# Patient Record
Sex: FEMALE | Race: WHITE | Employment: OTHER | URBAN - METROPOLITAN AREA
[De-identification: names, ages, dates, MRNs, and addresses within clinical notes are randomized per-mention and may not be internally consistent; named-entity substitution may affect disease eponyms.]

---

## 2019-01-13 ENCOUNTER — OFFICE VISIT (OUTPATIENT)
Dept: FAMILY MEDICINE CLINIC | Facility: CLINIC | Age: 29
End: 2019-01-13
Payer: COMMERCIAL

## 2019-01-13 VITALS
HEIGHT: 67 IN | SYSTOLIC BLOOD PRESSURE: 113 MMHG | DIASTOLIC BLOOD PRESSURE: 67 MMHG | HEART RATE: 84 BPM | TEMPERATURE: 98 F | WEIGHT: 134 LBS | RESPIRATION RATE: 12 BRPM | OXYGEN SATURATION: 100 % | BODY MASS INDEX: 21.03 KG/M2

## 2019-01-13 DIAGNOSIS — N30.00 ACUTE CYSTITIS WITHOUT HEMATURIA: Primary | ICD-10-CM

## 2019-01-13 LAB
MULTISTIX LOT#: ABNORMAL NUMERIC
PH, URINE: 5 (ref 4.5–8)
SPECIFIC GRAVITY: 1.02 (ref 1–1.03)
URINE-COLOR: YELLOW
UROBILINOGEN,SEMI-QN: 0.2 MG/DL (ref 0–1.9)

## 2019-01-13 PROCEDURE — 87186 SC STD MICRODIL/AGAR DIL: CPT | Performed by: NURSE PRACTITIONER

## 2019-01-13 PROCEDURE — 87088 URINE BACTERIA CULTURE: CPT | Performed by: NURSE PRACTITIONER

## 2019-01-13 PROCEDURE — 87086 URINE CULTURE/COLONY COUNT: CPT | Performed by: NURSE PRACTITIONER

## 2019-01-13 PROCEDURE — 81003 URINALYSIS AUTO W/O SCOPE: CPT | Performed by: NURSE PRACTITIONER

## 2019-01-13 PROCEDURE — 99202 OFFICE O/P NEW SF 15 MIN: CPT | Performed by: NURSE PRACTITIONER

## 2019-01-13 RX ORDER — NITROFURANTOIN 25; 75 MG/1; MG/1
100 CAPSULE ORAL 2 TIMES DAILY
Qty: 10 CAPSULE | Refills: 0 | Status: SHIPPED | OUTPATIENT
Start: 2019-01-13 | End: 2019-01-18

## 2019-01-13 RX ORDER — IBUPROFEN 200 MG
400 TABLET ORAL EVERY 6 HOURS PRN
COMMUNITY
End: 2021-09-07

## 2019-01-13 NOTE — PROGRESS NOTES
CHIEF COMPLAINT:   Patient presents with:  Urinary        HPI:   Stanley Farooq is a 33year old female presents with symptoms of UTI. Complaining of urinary frequency, urgency for last 2 days and dysuria today. Symptoms have been mild since onset.   Denies Recent Results (from the past 24 hour(s))   URINALYSIS, AUTO, W/O SCOPE    Collection Time: 01/13/19 11:03 AM   Result Value Ref Range    GLUCOSE (URINE DIPSTICK) neg mg/dL    BILIRUBIN neg Negative    KETONES (URINE DIPSTICK) neg Negative mg/dL    SPECIFI Whenever on antibiotics: Recommend PROBIOTICS supplement  OTC (such as Florastor or Culturelle), to restore normal bacteria balance, prevent yeast infection and GI effects/diarrhea from antibiotic therapy.   Take  DURING COURSE OF ANTIBIOTICS AND for the FO The most common place for an infection is in the bladder. This is called a bladder infection. This is one of the most common infections in women. Most bladder infections are easily treated. They are not serious unless the infection spreads to the kidney. Bladder infections are diagnosed by a urine test. They are treated with antibiotics and usually clear up quickly without complications. Treatment helps prevent a more serious kidney infection.   Medicines  Medicines can help in the treatment of a bladder in Call your healthcare provider if all symptoms are not gone after 3 days of treatment. This is especially important if you have repeat infections. If a culture was done, you will be told if your treatment needs to be changed.  If directed, you can call to f

## 2019-01-13 NOTE — PATIENT INSTRUCTIONS
-Discussed with patient that most UTI’s are resolved after 3 days on antibiotic, but should continue for 5-7 days if symptoms persist.     Whenever on antibiotics: Recommend PROBIOTICS supplement  OTC (such as Florastor or Culturelle), to restore normal Urine is normally doesn't have any bacteria in it. But bacteria can get into the urinary tract from the skin around the rectum. Or they can travel in the blood from elsewhere in the body.  Once they are in your urinary tract, they can cause infection in the · Procedures such as having a catheter inserted  · Older age  · Not emptying your bladder. This can allow bacteria a chance to grow in your urine.   · Dehydration  · Constipation  · Sex  · Use of a diaphragm for birth control   Treatment  Bladder infections · Urinate right after intercourse to flush out your bladder. · If you use birth control pills and have frequent bladder infections, discuss it with your doctor.   Follow-up care  Call your healthcare provider if all symptoms are not gone after 3 days of tr

## 2019-01-22 ENCOUNTER — OFFICE VISIT (OUTPATIENT)
Dept: FAMILY MEDICINE CLINIC | Facility: CLINIC | Age: 29
End: 2019-01-22
Payer: COMMERCIAL

## 2019-01-22 VITALS
BODY MASS INDEX: 19.7 KG/M2 | OXYGEN SATURATION: 100 % | SYSTOLIC BLOOD PRESSURE: 106 MMHG | HEART RATE: 90 BPM | WEIGHT: 130 LBS | HEIGHT: 68 IN | DIASTOLIC BLOOD PRESSURE: 76 MMHG | RESPIRATION RATE: 18 BRPM | TEMPERATURE: 98 F

## 2019-01-22 DIAGNOSIS — R34 DECREASED URINE OUTPUT: ICD-10-CM

## 2019-01-22 DIAGNOSIS — J06.9 VIRAL URI WITH COUGH: Primary | ICD-10-CM

## 2019-01-22 LAB
APPEARANCE: CLEAR
MULTISTIX LOT#: ABNORMAL NUMERIC
PH, URINE: 5.5 (ref 4.5–8)
POCT INFLUENZA A: NEGATIVE
POCT INFLUENZA B: NEGATIVE
SPECIFIC GRAVITY: 1 (ref 1–1.03)
UROBILINOGEN,SEMI-QN: 0.2 MG/DL (ref 0–1.9)

## 2019-01-22 PROCEDURE — 81003 URINALYSIS AUTO W/O SCOPE: CPT | Performed by: NURSE PRACTITIONER

## 2019-01-22 PROCEDURE — 87502 INFLUENZA DNA AMP PROBE: CPT | Performed by: NURSE PRACTITIONER

## 2019-01-22 PROCEDURE — 99213 OFFICE O/P EST LOW 20 MIN: CPT | Performed by: NURSE PRACTITIONER

## 2019-01-22 RX ORDER — BROMPHENIRAMINE MALEATE, PSEUDOEPHEDRINE HYDROCHLORIDE, AND DEXTROMETHORPHAN HYDROBROMIDE 2; 30; 10 MG/5ML; MG/5ML; MG/5ML
10 SYRUP ORAL 4 TIMES DAILY PRN
Qty: 120 ML | Refills: 0 | Status: SHIPPED | OUTPATIENT
Start: 2019-01-22 | End: 2021-09-07

## 2019-01-22 NOTE — PATIENT INSTRUCTIONS
·     · Hydrate! (cold or hot based on comfort). Drink lots of water or other non dehydrating liquids to help with illness. Salty foods, soups and tea can help with throat pain. The more fluid you drink, the more urine output you will have.     · DECREASE · If symptoms are severe, rest at home for the first 2 to 3 days. When you resume activity, don't let yourself get too tired. · Avoid being exposed to cigarette smoke (yours or others’).   · You may use acetaminophen or ibuprofen to control pain and fever, © 6579-5343 [employer] [address]. [address] [facility], [address] [place]. All rights reserved. This information is not intended as a substitute for professional medical care. Always follow your healthcare professional's instructions.           Adult · As your appetite returns, you can resume your normal diet. Ask your healthcare provider if there are any foods you should avoid.   When to seek medical care  When you first notice symptoms, ask your healthcare provider if antiviral medicines are appropria · Drink at least 12 8-ounce glasses of fluid every day to resolve the dehydration.  Fluid may include water; orange juice; lemonade; apple, grape, or cranberry juice; clear fruit drinks; electrolyte replacement and sports drinks; and teas and coffee without

## 2019-01-22 NOTE — PROGRESS NOTES
CHIEF COMPLAINT:   Patient presents with:  Cough/URI  UTI: uti symptoms better but still has little urine. seen on 1/13 and finished antibiotics. HPI:   Milad Moon is a 33year old female who presents for sudden onset of flu-like symptoms.  Sympto CARDIOVASCULAR: denies chest pain or palpitations   GI: denies abdominal pain      EXAM:   /76 (BP Location: Left arm, Patient Position: Sitting, Cuff Size: adult)   Pulse 90   Temp 98.2 °F (36.8 °C) (Oral)   Resp 18   Ht 68\"   Wt 130 lb   LMP 01/20 POCT INFLUENZA A Negative Negative    POCT INFLUENZA B Negative Negative    POCT Lot Number [id number]     POCT Expiration Date 10 11 19     POCT Procedure Control Control Valid Control Valid       ASSESSMENT AND PLAN:   Kenya Oconnor is a 33year old female · Saline nasal spray to nostrils if needed to help remove drainage or congestion in nose. · OTC Nasacort or Flonase Allergy 24HR (steroid nasal spray)  daily if needed for severe nasal congestion and post nasal drip, if not contraindicated.   · May use Tyl · Your appetite may be poor, so a light diet is fine. Avoid dehydration by drinking 6 to 8 glasses of fluids per day (water, soft drinks, juices, tea, or soup). Extra fluids will help loosen secretions in the nose and lungs.   · Over-the-counter cold medici · Take acetaminophen or a nonsteroidal anti-inflammatory agent (NSAID), such as ibuprofen. Treat a troubled nose kindly  · Breathe steam or heated humidified air to open blocked nasal passages.  a hot shower or use a vaporizer.  Be careful not to g Date Last Reviewed: 12/1/2016  © 7318-7442 [employer] [address]. [address] [facility], [address] [place]. All rights reserved. This information is not intended as a substitute for professional medical care.  Always follow your healthcare professional Call your healthcare provider right away if any of these occur:  · Continued vomiting  · Frequent diarrhea (more than 5 times a day); blood (red or black color) or mucus in diarrhea  · Blood in vomit or stool  · Swollen abdomen or increasing abdominal pain

## 2019-01-26 ENCOUNTER — OFFICE VISIT (OUTPATIENT)
Dept: FAMILY MEDICINE CLINIC | Facility: CLINIC | Age: 29
End: 2019-01-26
Payer: COMMERCIAL

## 2019-01-26 VITALS
OXYGEN SATURATION: 98 % | DIASTOLIC BLOOD PRESSURE: 69 MMHG | SYSTOLIC BLOOD PRESSURE: 106 MMHG | TEMPERATURE: 98 F | HEART RATE: 98 BPM | RESPIRATION RATE: 16 BRPM

## 2019-01-26 DIAGNOSIS — J06.9 VIRAL URI WITH COUGH: Primary | ICD-10-CM

## 2019-01-26 PROCEDURE — 99213 OFFICE O/P EST LOW 20 MIN: CPT | Performed by: NURSE PRACTITIONER

## 2019-01-26 RX ORDER — BENZONATATE 200 MG/1
CAPSULE ORAL
Qty: 20 CAPSULE | Refills: 0 | Status: SHIPPED | OUTPATIENT
Start: 2019-01-26 | End: 2021-09-07

## 2019-01-26 NOTE — PROGRESS NOTES
CHIEF COMPLAINT:   Patient presents with:  URI: URI Symptoms x1 week. Already seen here for Sxs 4 days ago. HPI:   Alexandria Jang is a 33year old female who presents for continued upper respiratory symptoms for  1 week now.  Pt was already seen here f SKIN: no rashes or abnormal skin lesions  HEENT: See HPI  LUNGS: denies shortness of breath or wheezing, See HPI  CARDIOVASCULAR: denies chest pain or palpitations   GI: denies N/V/C or abdominal pain  NEURO: Denies headaches    EXAM:   /69   Pulse 9 - Advised F/U visit if no improvement/worsening within 3-5 days; sooner if new fever >100.4F or worsening breathing. To ER if ever dyspnea, chest pain, SOB, or signs of dehydration.  - Pt verbalizes understanding and is agreeable w/ plan.     Meds & Refill · Your appetite may be poor, so a light diet is fine. Avoid dehydration by drinking 6 to 8 glasses of fluids per day (water, soft drinks, juices, tea, or soup). Extra fluids will help loosen secretions in the nose and lungs.   · Over-the-counter cold medici

## 2020-08-19 DIAGNOSIS — R50.9 FEVER AND CHILLS: Primary | ICD-10-CM

## 2020-08-19 DIAGNOSIS — R52 GENERALIZED BODY ACHES: ICD-10-CM

## 2020-08-20 ENCOUNTER — LAB ENCOUNTER (OUTPATIENT)
Dept: LAB | Facility: HOSPITAL | Age: 30
End: 2020-08-20
Attending: INTERNAL MEDICINE
Payer: COMMERCIAL

## 2020-08-20 DIAGNOSIS — R50.9 FEVER AND CHILLS: ICD-10-CM

## 2020-08-20 DIAGNOSIS — R52 GENERALIZED BODY ACHES: ICD-10-CM

## 2020-08-21 LAB — SARS-COV-2 RNA RESP QL NAA+PROBE: NOT DETECTED

## 2021-09-07 ENCOUNTER — OFFICE VISIT (OUTPATIENT)
Dept: FAMILY MEDICINE CLINIC | Facility: CLINIC | Age: 31
End: 2021-09-07
Payer: COMMERCIAL

## 2021-09-07 VITALS
BODY MASS INDEX: 21.19 KG/M2 | OXYGEN SATURATION: 97 % | DIASTOLIC BLOOD PRESSURE: 74 MMHG | SYSTOLIC BLOOD PRESSURE: 118 MMHG | WEIGHT: 135 LBS | HEART RATE: 74 BPM | TEMPERATURE: 98 F | HEIGHT: 67 IN | RESPIRATION RATE: 14 BRPM

## 2021-09-07 DIAGNOSIS — Z20.822 SUSPECTED COVID-19 VIRUS INFECTION: Primary | ICD-10-CM

## 2021-09-07 DIAGNOSIS — R50.9 FEVER AND CHILLS: ICD-10-CM

## 2021-09-07 LAB
CONTROL LINE PRESENT WITH A CLEAR BACKGROUND (YES/NO): YES YES/NO
KIT LOT #: NORMAL NUMERIC
STREP GRP A CUL-SCR: NEGATIVE

## 2021-09-07 PROCEDURE — 87880 STREP A ASSAY W/OPTIC: CPT | Performed by: PHYSICIAN ASSISTANT

## 2021-09-07 PROCEDURE — 3078F DIAST BP <80 MM HG: CPT | Performed by: PHYSICIAN ASSISTANT

## 2021-09-07 PROCEDURE — 3074F SYST BP LT 130 MM HG: CPT | Performed by: PHYSICIAN ASSISTANT

## 2021-09-07 PROCEDURE — 99213 OFFICE O/P EST LOW 20 MIN: CPT | Performed by: PHYSICIAN ASSISTANT

## 2021-09-07 PROCEDURE — 3008F BODY MASS INDEX DOCD: CPT | Performed by: PHYSICIAN ASSISTANT

## 2021-09-07 NOTE — PROGRESS NOTES
CHIEF COMPLAINT:   Patient presents with:  Covid-19 Test: out over weekend, fever on Sunday, nyquil/dayquil      HPI:   Valarie Miranda is a 35year old female who presents with symptoms as described below for 2 days.    Sx onset 9/5/21    • Fever:     Yes [x Breastfeeding No   BMI 21.14 kg/m²   GENERAL: appears well, well nourished, in no apparent distress  SKIN: no rashes,no suspicious lesions  HEAD: atraumatic, normocephalic.     EYES: conjunctiva clear  EARS: TM's non injected, no bulging, no retraction,no answered.

## 2021-09-07 NOTE — PATIENT INSTRUCTIONS
Coronavirus Disease 2019 (COVID-19)     [facility] is committed to the safety and well-being of our patients, members, employees, and communities.  As concerns arise about the new strain of coronavirus that causes COVID-19, [facility] exposure  • After day 7 from date of last exposure with a negative test result (test must occur on day 5 or later)  After stopping quarantine, you should  • Watch for symptoms until 14 days after exposure.   • If you have symptoms, immediately self-isolate Care     If you are awaiting test results or are confirmed positive for COVID -19, and your symptoms worsen at home with symptoms such as: extreme weakness, difficult breathing, or unrelenting fevers greater than 100.4 degrees Fahrenheit, you should contac Follow-up  If you are diagnosed with COVID, refrain from exercise until approved by your primary care provider. Please call your primary care provider within 2 days of your discharge to arrange for a telehealth follow-up.  CDC does not recommend repeat test Control & Prevention (CDC)  10 things you can do to manage your health at home, [email or web address]. pdf  [email or web address] Retrieved March 17, 2021, from [email or web address]-[email or web address]. html  Long-term effects of covid-19. (n.d.).  Retrieved May 11, 2021, from [email or web address]

## 2021-09-08 LAB — SARS-COV-2 RNA RESP QL NAA+PROBE: NOT DETECTED

## 2022-01-14 ENCOUNTER — HOSPITAL ENCOUNTER (OUTPATIENT)
Dept: GENERAL RADIOLOGY | Facility: HOSPITAL | Age: 32
Discharge: HOME | End: 2022-01-14
Attending: INTERNAL MEDICINE
Payer: COMMERCIAL

## 2022-01-14 DIAGNOSIS — U07.1 COVID-19: ICD-10-CM

## 2022-01-14 PROCEDURE — 71046 X-RAY EXAM CHEST 2 VIEWS: CPT | Performed by: INTERNAL MEDICINE

## 2022-01-20 ENCOUNTER — EXTERNAL LAB (OUTPATIENT)
Dept: OTHER | Age: 32
End: 2022-01-20

## 2022-04-05 ENCOUNTER — EXTERNAL RECORD (OUTPATIENT)
Dept: HEALTH INFORMATION MANAGEMENT | Facility: OTHER | Age: 32
End: 2022-04-05

## 2022-04-05 LAB
ABO + RH BLD: NORMAL
BLD GP AB SCN SERPL QL GEL: NEGATIVE
HBV SURFACE AG SER QL: NEGATIVE
HCV AB SER QL: NEGATIVE
HIV 1+2 AB+HIV1 P24 AG SERPL QL IA: NORMAL
PLT: 257 K/MCL
RPR SER QL: NORMAL
RUBV IGG SERPL IA-ACNC: NORMAL

## 2022-04-12 ENCOUNTER — EXTERNAL LAB (OUTPATIENT)
Dept: OTHER | Age: 32
End: 2022-04-12

## 2022-04-26 ENCOUNTER — EXTERNAL LAB (OUTPATIENT)
Dept: OTHER | Age: 32
End: 2022-04-26

## 2022-05-04 ENCOUNTER — EXTERNAL LAB (OUTPATIENT)
Dept: OTHER | Age: 32
End: 2022-05-04

## 2022-06-02 ENCOUNTER — EXTERNAL RECORD (OUTPATIENT)
Dept: HEALTH INFORMATION MANAGEMENT | Facility: OTHER | Age: 32
End: 2022-06-02

## 2022-07-09 ENCOUNTER — EXTERNAL RECORD (OUTPATIENT)
Dept: HEALTH INFORMATION MANAGEMENT | Facility: OTHER | Age: 32
End: 2022-07-09

## 2022-08-06 LAB
1HR O'SULLIVAN: 161
HGB BLD-MCNC: 12 G/DL
PLT: 189 K/MCL

## 2022-08-13 LAB
GLUCOSE 1H P 100 G GLC PO SERPL-MCNC: 166 MG/DL
GLUCOSE 2H P 100 G GLC PO SERPL-MCNC: 139 MG/DL
GLUCOSE 3H P 100 G GLC PO SERPL-MCNC: 70 MG/DL
GLUCOSE P FAST SERPL-MCNC: 71 MG/DL

## 2022-10-06 ENCOUNTER — CLINICAL ABSTRACT (OUTPATIENT)
Dept: OBGYN | Age: 32
End: 2022-10-06

## 2022-10-17 ENCOUNTER — PRIOR ORIGINAL RECORDS (OUTPATIENT)
Dept: HEALTH INFORMATION MANAGEMENT | Facility: OTHER | Age: 32
End: 2022-10-17

## 2022-10-28 ENCOUNTER — EXTERNAL RECORD (OUTPATIENT)
Dept: HEALTH INFORMATION MANAGEMENT | Facility: OTHER | Age: 32
End: 2022-10-28

## 2022-10-28 VITALS
WEIGHT: 184 LBS | BODY MASS INDEX: 28.88 KG/M2 | HEIGHT: 67 IN | DIASTOLIC BLOOD PRESSURE: 84 MMHG | SYSTOLIC BLOOD PRESSURE: 125 MMHG

## 2022-10-31 ENCOUNTER — OB CHECK (OUTPATIENT)
Dept: OBGYN | Age: 32
End: 2022-10-31

## 2022-10-31 VITALS
DIASTOLIC BLOOD PRESSURE: 82 MMHG | BODY MASS INDEX: 29.65 KG/M2 | WEIGHT: 186.5 LBS | HEART RATE: 88 BPM | SYSTOLIC BLOOD PRESSURE: 127 MMHG | OXYGEN SATURATION: 97 %

## 2022-10-31 DIAGNOSIS — D50.8 IRON DEFICIENCY ANEMIA SECONDARY TO INADEQUATE DIETARY IRON INTAKE: Primary | ICD-10-CM

## 2022-10-31 DIAGNOSIS — Z36.85 ANTENATAL SCREENING FOR STREPTOCOCCUS B: ICD-10-CM

## 2022-10-31 DIAGNOSIS — Z36.9 ENCOUNTER FOR ANTENATAL SCREENING OF MOTHER: ICD-10-CM

## 2022-10-31 DIAGNOSIS — Z98.890 HISTORY OF LOOP ELECTROSURGICAL EXCISION PROCEDURE (LEEP) OF CERVIX AFFECTING PREGNANCY, ANTEPARTUM: ICD-10-CM

## 2022-10-31 DIAGNOSIS — O34.40 HISTORY OF LOOP ELECTROSURGICAL EXCISION PROCEDURE (LEEP) OF CERVIX AFFECTING PREGNANCY, ANTEPARTUM: ICD-10-CM

## 2022-10-31 PROBLEM — D50.9 IRON DEFICIENCY ANEMIA: Status: ACTIVE | Noted: 2022-10-31

## 2022-10-31 PROBLEM — J45.909 ASTHMA: Status: ACTIVE | Noted: 2022-10-31

## 2022-10-31 PROCEDURE — 87081 CULTURE SCREEN ONLY: CPT | Performed by: INTERNAL MEDICINE

## 2022-10-31 PROCEDURE — 0502F SUBSEQUENT PRENATAL CARE: CPT | Performed by: OBSTETRICS & GYNECOLOGY

## 2022-10-31 PROCEDURE — 87147 CULTURE TYPE IMMUNOLOGIC: CPT | Performed by: INTERNAL MEDICINE

## 2022-11-01 ENCOUNTER — LAB SERVICES (OUTPATIENT)
Dept: OBGYN | Age: 32
End: 2022-11-01

## 2022-11-01 DIAGNOSIS — Z36.9 ENCOUNTER FOR ANTENATAL SCREENING OF MOTHER: ICD-10-CM

## 2022-11-01 LAB
APPEARANCE UR: CLEAR
BACTERIA #/AREA URNS HPF: ABNORMAL /HPF
BILIRUB UR QL: NEGATIVE MG/DL
COLOR UR: YELLOW
ERYTHROCYTE [DISTWIDTH] IN BLOOD: 15.2 % (ref 11–15)
FERRITIN SERPL-MCNC: 4 NG/ML (ref 8–252)
GLUCOSE UR-MCNC: NEGATIVE MG/DL
HCT VFR BLD CALC: 31.2 % (ref 35–45)
HGB A1 MFR BLD ELPH: 98.7 %
HGB A2 MFR BLD ELPH: 1.3 % (ref 2.2–3.2)
HGB BLD-MCNC: 9.1 G/DL (ref 11.7–15.5)
HGB F MFR BLD ELPH: <1 %
HGB FRACT BLD ELPH-IMP: ABNORMAL
HGB UR QL: NEGATIVE
IRON SATN MFR SERPL: 4 % (ref 20–55)
IRON SERPL-MCNC: 25 UG/DL (ref 40–170)
KETONES UR-MCNC: NEGATIVE MG/DL
LAB RESULT: NORMAL
LAB RESULT: NORMAL
LEUKOCYTE ESTERASE UR QL STRIP: NEGATIVE LEU/UL
MATERNIT21 SUMMARY: NEGATIVE
MCH RBC QN AUTO: 21 PG (ref 27–33)
MCHC RBC AUTO-ENTMCNC: ABNORMAL G/DL
MCV RBC AUTO: 71.9 FL (ref 80–100)
MUCOUS THREADS URNS QL MICRO: ABNORMAL /HPF
NITRITE UR QL: NEGATIVE
PH UR: 6 [PH] (ref 5–9)
PROT UR QL: NEGATIVE MG/DL
RBC # BLD: 4.34 MILLION/UL (ref 3.8–5.1)
RBC #/AREA URNS HPF: ABNORMAL /HPF
SP GR UR: 1.02 (ref 1–1.03)
SQUAMOUS #/AREA URNS HPF: ABNORMAL /HPF
TIBC SERPL-MCNC: 560 UG/DL (ref 250–450)
TRANSFERRIN SERPL-MCNC: 400 MG/DL (ref 200–360)
UROBILINOGEN UR QL: <2 MG/DL
WBC # BLD: ABNORMAL 10*3/UL
WBC #/AREA URNS HPF: ABNORMAL /HPF

## 2022-11-01 PROCEDURE — 86592 SYPHILIS TEST NON-TREP QUAL: CPT | Performed by: INTERNAL MEDICINE

## 2022-11-01 PROCEDURE — 36415 COLL VENOUS BLD VENIPUNCTURE: CPT | Performed by: INTERNAL MEDICINE

## 2022-11-01 PROCEDURE — 87389 HIV-1 AG W/HIV-1&-2 AB AG IA: CPT | Performed by: INTERNAL MEDICINE

## 2022-11-02 LAB
HIV 1+2 AB+HIV1 P24 AG SERPL QL IA: NONREACTIVE
RPR SER QL: NONREACTIVE

## 2022-11-03 LAB — GP B STREP SPEC QL CULT: ABNORMAL

## 2022-11-04 PROBLEM — Z22.330 GBS CARRIER: Status: ACTIVE | Noted: 2022-11-04

## 2022-11-07 ENCOUNTER — OB CHECK (OUTPATIENT)
Dept: OBGYN | Age: 32
End: 2022-11-07

## 2022-11-07 ENCOUNTER — CLINICAL ABSTRACT (OUTPATIENT)
Dept: HEALTH INFORMATION MANAGEMENT | Facility: OTHER | Age: 32
End: 2022-11-07

## 2022-11-07 VITALS
WEIGHT: 187 LBS | BODY MASS INDEX: 29.35 KG/M2 | DIASTOLIC BLOOD PRESSURE: 85 MMHG | HEIGHT: 67 IN | SYSTOLIC BLOOD PRESSURE: 122 MMHG | TEMPERATURE: 97.5 F

## 2022-11-07 DIAGNOSIS — Z14.8 GENETIC CARRIER STATUS: ICD-10-CM

## 2022-11-07 DIAGNOSIS — Z34.03 ENCOUNTER FOR SUPERVISION OF NORMAL FIRST PREGNANCY IN THIRD TRIMESTER: Primary | ICD-10-CM

## 2022-11-07 PROCEDURE — 0502F SUBSEQUENT PRENATAL CARE: CPT | Performed by: OBSTETRICS & GYNECOLOGY

## 2022-11-14 ENCOUNTER — OB CHECK (OUTPATIENT)
Dept: OBGYN | Age: 32
End: 2022-11-14

## 2022-11-14 VITALS
OXYGEN SATURATION: 98 % | HEIGHT: 67 IN | DIASTOLIC BLOOD PRESSURE: 74 MMHG | WEIGHT: 188.11 LBS | SYSTOLIC BLOOD PRESSURE: 126 MMHG | TEMPERATURE: 97.9 F | HEART RATE: 85 BPM | BODY MASS INDEX: 29.53 KG/M2

## 2022-11-14 DIAGNOSIS — Z34.03 ENCOUNTER FOR SUPERVISION OF NORMAL FIRST PREGNANCY IN THIRD TRIMESTER: Primary | ICD-10-CM

## 2022-11-14 PROCEDURE — 0502F SUBSEQUENT PRENATAL CARE: CPT | Performed by: OBSTETRICS & GYNECOLOGY

## 2022-11-14 ASSESSMENT — PAIN SCALES - GENERAL: PAINLEVEL: 0

## 2022-11-15 ENCOUNTER — TELEPHONE (OUTPATIENT)
Dept: OBGYN | Age: 32
End: 2022-11-15

## 2022-11-18 ENCOUNTER — ANESTHESIA EVENT (OUTPATIENT)
Dept: OBGYN | Age: 32
End: 2022-11-18

## 2022-11-18 ENCOUNTER — ANESTHESIA (OUTPATIENT)
Dept: OBGYN | Age: 32
End: 2022-11-18

## 2022-11-18 ENCOUNTER — HOSPITAL ENCOUNTER (INPATIENT)
Age: 32
LOS: 2 days | Discharge: HOME | End: 2022-11-20
Attending: OBSTETRICS & GYNECOLOGY | Admitting: OBSTETRICS & GYNECOLOGY

## 2022-11-18 DIAGNOSIS — Z37.9 NORMAL LABOR: ICD-10-CM

## 2022-11-18 LAB
ABO + RH BLD: NORMAL
BASOPHILS # BLD: 0 K/MCL (ref 0–0.3)
BASOPHILS NFR BLD: 0 %
BLD GP AB SCN SERPL QL GEL: NEGATIVE
DEPRECATED RDW RBC: 38.9 FL (ref 39–50)
EOSINOPHIL # BLD: 0.1 K/MCL (ref 0–0.5)
EOSINOPHIL NFR BLD: 0 %
ERYTHROCYTE [DISTWIDTH] IN BLOOD: 12 % (ref 11–15)
HCT VFR BLD CALC: 40.5 % (ref 36–46.5)
HGB BLD-MCNC: 13.6 G/DL (ref 12–15.5)
IMM GRANULOCYTES # BLD AUTO: 0.1 K/MCL (ref 0–0.2)
IMM GRANULOCYTES # BLD: 1 %
LYMPHOCYTES # BLD: 2.1 K/MCL (ref 1–4.8)
LYMPHOCYTES NFR BLD: 15 %
MCH RBC QN AUTO: 30.1 PG (ref 26–34)
MCHC RBC AUTO-ENTMCNC: 33.6 G/DL (ref 32–36.5)
MCV RBC AUTO: 89.6 FL (ref 78–100)
MONOCYTES # BLD: 0.9 K/MCL (ref 0.3–0.9)
MONOCYTES NFR BLD: 6 %
NEUTROPHILS # BLD: 11.3 K/MCL (ref 1.8–7.7)
NEUTROPHILS NFR BLD: 78 %
NRBC BLD MANUAL-RTO: 0 /100 WBC
PLATELET # BLD AUTO: 143 K/MCL (ref 140–450)
RBC # BLD: 4.52 MIL/MCL (ref 4–5.2)
SARS-COV-2 RNA RESP QL NAA+PROBE: NOT DETECTED
SERVICE CMNT-IMP: NORMAL
SERVICE CMNT-IMP: NORMAL
TYPE AND SCREEN EXPIRATION DATE: NORMAL
WBC # BLD: 14.5 K/MCL (ref 4.2–11)

## 2022-11-18 PROCEDURE — 10002801 HB RX 250 W/O HCPCS: Performed by: ANESTHESIOLOGY

## 2022-11-18 PROCEDURE — 13000001 HB PHASE II RECOVERY EA 30 MINUTES

## 2022-11-18 PROCEDURE — 86901 BLOOD TYPING SEROLOGIC RH(D): CPT | Performed by: OBSTETRICS & GYNECOLOGY

## 2022-11-18 PROCEDURE — 13001455 HB PERINATAL CARE HIGH RISK

## 2022-11-18 PROCEDURE — 10907ZC DRAINAGE OF AMNIOTIC FLUID, THERAPEUTIC FROM PRODUCTS OF CONCEPTION, VIA NATURAL OR ARTIFICIAL OPENING: ICD-10-PCS | Performed by: OBSTETRICS & GYNECOLOGY

## 2022-11-18 PROCEDURE — 0UQMXZZ REPAIR VULVA, EXTERNAL APPROACH: ICD-10-PCS | Performed by: OBSTETRICS & GYNECOLOGY

## 2022-11-18 PROCEDURE — 10002803 HB RX 637: Performed by: OBSTETRICS & GYNECOLOGY

## 2022-11-18 PROCEDURE — 59400 OBSTETRICAL CARE: CPT | Performed by: OBSTETRICS & GYNECOLOGY

## 2022-11-18 PROCEDURE — 87635 SARS-COV-2 COVID-19 AMP PRB: CPT | Performed by: OBSTETRICS & GYNECOLOGY

## 2022-11-18 PROCEDURE — 36415 COLL VENOUS BLD VENIPUNCTURE: CPT | Performed by: OBSTETRICS & GYNECOLOGY

## 2022-11-18 PROCEDURE — 10000003 HB ROOM CHARGE WOMEN'S HEALTH

## 2022-11-18 PROCEDURE — 10002807 HB RX 258: Performed by: OBSTETRICS & GYNECOLOGY

## 2022-11-18 PROCEDURE — 13000012 HB ANESTHESIA SPINAL/EPIDURAL IN L&D

## 2022-11-18 PROCEDURE — 13003251 HB VAGINAL DELIVERY HIGH RISK

## 2022-11-18 PROCEDURE — 10004651 HB RX, NO CHARGE ITEM: Performed by: OBSTETRICS & GYNECOLOGY

## 2022-11-18 PROCEDURE — 85025 COMPLETE CBC W/AUTO DIFF WBC: CPT | Performed by: OBSTETRICS & GYNECOLOGY

## 2022-11-18 PROCEDURE — 10002807 HB RX 258

## 2022-11-18 PROCEDURE — 10002800 HB RX 250 W HCPCS: Performed by: OBSTETRICS & GYNECOLOGY

## 2022-11-18 PROCEDURE — 0KQM0ZZ REPAIR PERINEUM MUSCLE, OPEN APPROACH: ICD-10-PCS | Performed by: OBSTETRICS & GYNECOLOGY

## 2022-11-18 RX ORDER — 0.9 % SODIUM CHLORIDE 0.9 %
2 VIAL (ML) INJECTION EVERY 12 HOURS SCHEDULED
Status: DISCONTINUED | OUTPATIENT
Start: 2022-11-18 | End: 2022-11-18

## 2022-11-18 RX ORDER — VITAMIN A, VITAMIN C, VITAMIN D-3, VITAMIN E, VITAMIN B-1, VITAMIN B-2, NIACIN, VITAMIN B-6, CALCIUM, IRON, ZINC, COPPER 4000; 120; 400; 22; 1.84; 3; 20; 10; 1; 12; 200; 27; 25; 2 [IU]/1; MG/1; [IU]/1; MG/1; MG/1; MG/1; MG/1; MG/1; MG/1; UG/1; MG/1; MG/1; MG/1; MG/1
1 TABLET ORAL DAILY
Status: DISCONTINUED | OUTPATIENT
Start: 2022-11-18 | End: 2022-11-20 | Stop reason: HOSPADM

## 2022-11-18 RX ORDER — ONDANSETRON 2 MG/ML
4 INJECTION INTRAMUSCULAR; INTRAVENOUS 2 TIMES DAILY PRN
Status: DISCONTINUED | OUTPATIENT
Start: 2022-11-18 | End: 2022-11-20 | Stop reason: HOSPADM

## 2022-11-18 RX ORDER — NALOXONE HCL 0.4 MG/ML
0.1 VIAL (ML) INJECTION PRN
Status: DISCONTINUED | OUTPATIENT
Start: 2022-11-18 | End: 2022-11-18

## 2022-11-18 RX ORDER — OXYTOCIN/0.9 % SODIUM CHLORIDE 30/500 ML
0-25 PLASTIC BAG, INJECTION (ML) INTRAVENOUS CONTINUOUS
Status: DISCONTINUED | OUTPATIENT
Start: 2022-11-18 | End: 2022-11-18

## 2022-11-18 RX ORDER — CALCIUM CARBONATE 500 MG/1
500 TABLET, CHEWABLE ORAL EVERY 4 HOURS PRN
Status: DISCONTINUED | OUTPATIENT
Start: 2022-11-18 | End: 2022-11-20 | Stop reason: HOSPADM

## 2022-11-18 RX ORDER — OXYTOCIN-SODIUM CHLORIDE 0.9% IV SOLN 30 UNIT/500ML 30-0.9/5 UT/ML-%
0-334 SOLUTION INTRAVENOUS CONTINUOUS
Status: DISCONTINUED | OUTPATIENT
Start: 2022-11-18 | End: 2022-11-20 | Stop reason: HOSPADM

## 2022-11-18 RX ORDER — HEPARIN SOD,PORK IN 0.45% NACL 25000/500
INTRAVENOUS SOLUTION INTRAVENOUS
Status: COMPLETED
Start: 2022-11-18 | End: 2022-11-18

## 2022-11-18 RX ORDER — NALBUPHINE HYDROCHLORIDE 10 MG/ML
2.5 INJECTION, SOLUTION INTRAMUSCULAR; INTRAVENOUS; SUBCUTANEOUS
Status: DISCONTINUED | OUTPATIENT
Start: 2022-11-18 | End: 2022-11-18

## 2022-11-18 RX ORDER — BUPIVACAINE HYDROCHLORIDE 2.5 MG/ML
INJECTION, SOLUTION EPIDURAL; INFILTRATION; INTRACAUDAL
Status: DISPENSED
Start: 2022-11-18 | End: 2022-11-18

## 2022-11-18 RX ORDER — ACETAMINOPHEN 325 MG/1
650 TABLET ORAL EVERY 6 HOURS
Status: DISCONTINUED | OUTPATIENT
Start: 2022-11-18 | End: 2022-11-20 | Stop reason: HOSPADM

## 2022-11-18 RX ORDER — AMPICILLIN 2 G/1
INJECTION, POWDER, FOR SOLUTION INTRAVENOUS
Status: DISPENSED
Start: 2022-11-18 | End: 2022-11-18

## 2022-11-18 RX ORDER — LIDOCAINE HYDROCHLORIDE AND EPINEPHRINE 15; 5 MG/ML; UG/ML
INJECTION, SOLUTION EPIDURAL
Status: COMPLETED
Start: 2022-11-18 | End: 2022-11-18

## 2022-11-18 RX ORDER — BUPIVACAINE HYDROCHLORIDE 2.5 MG/ML
10 INJECTION, SOLUTION EPIDURAL; INFILTRATION; INTRACAUDAL ONCE
Status: COMPLETED | OUTPATIENT
Start: 2022-11-18 | End: 2022-11-18

## 2022-11-18 RX ORDER — LIDOCAINE HYDROCHLORIDE AND EPINEPHRINE 15; 5 MG/ML; UG/ML
INJECTION, SOLUTION EPIDURAL PRN
Status: DISCONTINUED | OUTPATIENT
Start: 2022-11-18 | End: 2022-11-19

## 2022-11-18 RX ORDER — IBUPROFEN 600 MG/1
600 TABLET ORAL EVERY 6 HOURS
Status: DISCONTINUED | OUTPATIENT
Start: 2022-11-18 | End: 2022-11-20 | Stop reason: HOSPADM

## 2022-11-18 RX ORDER — SODIUM CHLORIDE, SODIUM LACTATE, POTASSIUM CHLORIDE, CALCIUM CHLORIDE 600; 310; 30; 20 MG/100ML; MG/100ML; MG/100ML; MG/100ML
INJECTION, SOLUTION INTRAVENOUS
Status: COMPLETED
Start: 2022-11-18 | End: 2022-11-18

## 2022-11-18 RX ORDER — HEPARIN SOD,PORK IN 0.45% NACL 25000/500
INTRAVENOUS SOLUTION INTRAVENOUS CONTINUOUS
Status: DISCONTINUED | OUTPATIENT
Start: 2022-11-18 | End: 2022-11-18

## 2022-11-18 RX ORDER — SODIUM CHLORIDE, SODIUM LACTATE, POTASSIUM CHLORIDE, CALCIUM CHLORIDE 600; 310; 30; 20 MG/100ML; MG/100ML; MG/100ML; MG/100ML
INJECTION, SOLUTION INTRAVENOUS CONTINUOUS
Status: DISCONTINUED | OUTPATIENT
Start: 2022-11-18 | End: 2022-11-18

## 2022-11-18 RX ORDER — LIDOCAINE HYDROCHLORIDE 10 MG/ML
30 INJECTION, SOLUTION EPIDURAL; INFILTRATION; INTRACAUDAL; PERINEURAL
Status: DISCONTINUED | OUTPATIENT
Start: 2022-11-18 | End: 2022-11-18

## 2022-11-18 RX ORDER — OXYTOCIN 10 [USP'U]/ML
10 INJECTION, SOLUTION INTRAMUSCULAR; INTRAVENOUS
Status: DISCONTINUED | OUTPATIENT
Start: 2022-11-18 | End: 2022-11-18

## 2022-11-18 RX ORDER — SODIUM CHLORIDE 9 MG/ML
INJECTION, SOLUTION INTRAVENOUS
Status: DISPENSED
Start: 2022-11-18 | End: 2022-11-18

## 2022-11-18 RX ORDER — OXYTOCIN-SODIUM CHLORIDE 0.9% IV SOLN 30 UNIT/500ML 30-0.9/5 UT/ML-%
0-334 SOLUTION INTRAVENOUS CONTINUOUS
Status: DISCONTINUED | OUTPATIENT
Start: 2022-11-18 | End: 2022-11-18

## 2022-11-18 RX ORDER — HYDROCORTISONE ACETATE 25 MG/1
25 SUPPOSITORY RECTAL EVERY 8 HOURS PRN
Status: DISCONTINUED | OUTPATIENT
Start: 2022-11-18 | End: 2022-11-20 | Stop reason: HOSPADM

## 2022-11-18 RX ORDER — SIMETHICONE 125 MG
125 TABLET,CHEWABLE ORAL EVERY 4 HOURS PRN
Status: DISCONTINUED | OUTPATIENT
Start: 2022-11-18 | End: 2022-11-20 | Stop reason: HOSPADM

## 2022-11-18 RX ADMIN — BUPIVACAINE HYDROCHLORIDE 10 ML: 2.5 INJECTION, SOLUTION EPIDURAL; INFILTRATION; INTRACAUDAL; PERINEURAL at 11:09

## 2022-11-18 RX ADMIN — AMPICILLIN SODIUM 1000 MG: 1 INJECTION, POWDER, FOR SOLUTION INTRAMUSCULAR; INTRAVENOUS at 17:10

## 2022-11-18 RX ADMIN — SODIUM CHLORIDE, POTASSIUM CHLORIDE, SODIUM LACTATE AND CALCIUM CHLORIDE: 600; 310; 30; 20 INJECTION, SOLUTION INTRAVENOUS at 10:55

## 2022-11-18 RX ADMIN — OXYTOCIN-SODIUM CHLORIDE 0.9% IV SOLN 30 UNIT/500ML 2 MILLI-UNITS/MIN: 30-0.9/5 SOLUTION at 11:48

## 2022-11-18 RX ADMIN — AMPICILLIN SODIUM 1000 MG: 1 INJECTION, POWDER, FOR SOLUTION INTRAMUSCULAR; INTRAVENOUS at 13:29

## 2022-11-18 RX ADMIN — SODIUM CHLORIDE, POTASSIUM CHLORIDE, SODIUM LACTATE AND CALCIUM CHLORIDE 1000 ML: 600; 310; 30; 20 INJECTION, SOLUTION INTRAVENOUS at 09:30

## 2022-11-18 RX ADMIN — IBUPROFEN 600 MG: 600 TABLET, FILM COATED ORAL at 20:25

## 2022-11-18 RX ADMIN — ACETAMINOPHEN 650 MG: 325 TABLET ORAL at 20:25

## 2022-11-18 RX ADMIN — AMPICILLIN 2000 MG: 2 INJECTION, POWDER, FOR SOLUTION INTRAMUSCULAR; INTRAVENOUS at 09:34

## 2022-11-18 RX ADMIN — LIDOCAINE HYDROCHLORIDE,EPINEPHRINE BITARTRATE 3 ML: 15; .005 INJECTION, SOLUTION EPIDURAL; INFILTRATION; INTRACAUDAL; PERINEURAL at 11:03

## 2022-11-18 RX ADMIN — Medication 10 ML/HR: at 11:12

## 2022-11-18 ASSESSMENT — LIFESTYLE VARIABLES
HOW MANY STANDARD DRINKS CONTAINING ALCOHOL DO YOU HAVE ON A TYPICAL DAY: 0,1 OR 2
HOW OFTEN DO YOU HAVE A DRINK CONTAINING ALCOHOL: NEVER
HOW OFTEN DO YOU HAVE 6 OR MORE DRINKS ON ONE OCCASION: NEVER
FEEDING: INDEPENDENT
SHORT OF BREATH OR FATIGUE WITH ADLS: NO
IS PATIENT ABLE TO COMPLETE ASSESSMENT AT THIS TIME: NO (T)
ADL SCORE: 24
ENJOYING LIFE WITHOUT USING ALCOHOL OR DRUGS: INDEPENDENT
ARE YOU BLIND OR DO YOU HAVE SERIOUS DIFFICULTY SEEING, EVEN WHEN WEARING GLASSES: NO
HISTORY OF PROBLEMS WHEN YOU STOP DRINKING ALCOHOL: NO
CHRONIC/CANCER PAIN PRESENT: NO
ALCOHOL_USE_STATUS: NO OR LOW RISK WITH VALIDATED TOOL
AUDIT-C TOTAL SCORE: 0
LAST DRINK YOU HAD: 48 HOURS OR LESS
TRANSFERRING: INDEPENDENT
ARE YOU DEAF OR DO YOU HAVE SERIOUS DIFFICULTY  HEARING: NO
RECENT DECLINE IN ADLS: NO
ADL NEEDS ASSIST: NO
CONTINENCE: INDEPENDENT
DRESSING: INDEPENDENT
TOILETING: INDEPENDENT
ADL BEFORE ADMISSION: INDEPENDENT

## 2022-11-18 ASSESSMENT — PAIN SCALES - GENERAL
PAINLEVEL_OUTOF10: 3
PAINLEVEL_OUTOF10: 0
PAINLEVEL_OUTOF10: 9
PAINLEVEL_OUTOF10: 0

## 2022-11-18 ASSESSMENT — COGNITIVE AND FUNCTIONAL STATUS - GENERAL
DO YOU HAVE DIFFICULTY DRESSING OR BATHING: NO
BECAUSE OF A PHYSICAL, MENTAL, OR EMOTIONAL CONDITION, DO YOU HAVE DIFFICULTY DOING ERRANDS ALONE: NO
BECAUSE OF A PHYSICAL, MENTAL, OR EMOTIONAL CONDITION, DO YOU HAVE SERIOUS DIFFICULTY CONCENTRATING, REMEMBERING OR MAKING DECISIONS: NO
DO YOU HAVE SERIOUS DIFFICULTY WALKING OR CLIMBING STAIRS: NO
DO YOU HAVE DIFFICULTY DRESSING OR BATHING: NO
BECAUSE OF A PHYSICAL, MENTAL, OR EMOTIONAL CONDITION, DO YOU HAVE SERIOUS DIFFICULTY CONCENTRATING, REMEMBERING OR MAKING DECISIONS: NO
DO YOU HAVE SERIOUS DIFFICULTY WALKING OR CLIMBING STAIRS: NO
BECAUSE OF A PHYSICAL, MENTAL, OR EMOTIONAL CONDITION, DO YOU HAVE DIFFICULTY DOING ERRANDS ALONE: NO

## 2022-11-18 ASSESSMENT — ENCOUNTER SYMPTOMS: EXERCISE TOLERANCE: GOOD (>4 METS)

## 2022-11-19 LAB
DEPRECATED RDW RBC: 40 FL (ref 39–50)
ERYTHROCYTE [DISTWIDTH] IN BLOOD: 12 % (ref 11–15)
HCT VFR BLD CALC: 35.8 % (ref 36–46.5)
HGB BLD-MCNC: 11.8 G/DL (ref 12–15.5)
MCH RBC QN AUTO: 29.8 PG (ref 26–34)
MCHC RBC AUTO-ENTMCNC: 33 G/DL (ref 32–36.5)
MCV RBC AUTO: 90.4 FL (ref 78–100)
NRBC BLD MANUAL-RTO: 0 /100 WBC
PLATELET # BLD AUTO: 123 K/MCL (ref 140–450)
RBC # BLD: 3.96 MIL/MCL (ref 4–5.2)
WBC # BLD: 14 K/MCL (ref 4.2–11)

## 2022-11-19 PROCEDURE — 10002803 HB RX 637: Performed by: OBSTETRICS & GYNECOLOGY

## 2022-11-19 PROCEDURE — 36415 COLL VENOUS BLD VENIPUNCTURE: CPT | Performed by: OBSTETRICS & GYNECOLOGY

## 2022-11-19 PROCEDURE — 99024 POSTOP FOLLOW-UP VISIT: CPT | Performed by: OBSTETRICS & GYNECOLOGY

## 2022-11-19 PROCEDURE — 10000003 HB ROOM CHARGE WOMEN'S HEALTH

## 2022-11-19 PROCEDURE — 85027 COMPLETE CBC AUTOMATED: CPT | Performed by: OBSTETRICS & GYNECOLOGY

## 2022-11-19 PROCEDURE — 10004651 HB RX, NO CHARGE ITEM: Performed by: OBSTETRICS & GYNECOLOGY

## 2022-11-19 RX ADMIN — VITAMIN A, VITAMIN C, VITAMIN D, VITAMIN E, THIAMINE, RIBOFLAVIN, NIACIN, VITAMIN B6, FOLIC ACID, VITAMIN B12, CALCIUM, IRON, ZINC, COPPER 1 TABLET: 4000; 120; 400; 22; 1.84; 3; 20; 10; 1; 12; 200; 27; 25; 2 TABLET ORAL at 08:51

## 2022-11-19 RX ADMIN — IBUPROFEN 600 MG: 600 TABLET, FILM COATED ORAL at 02:11

## 2022-11-19 RX ADMIN — IBUPROFEN 600 MG: 600 TABLET, FILM COATED ORAL at 08:51

## 2022-11-19 RX ADMIN — ACETAMINOPHEN 650 MG: 325 TABLET ORAL at 02:11

## 2022-11-19 RX ADMIN — ACETAMINOPHEN 650 MG: 325 TABLET ORAL at 18:07

## 2022-11-19 RX ADMIN — IBUPROFEN 600 MG: 600 TABLET, FILM COATED ORAL at 21:57

## 2022-11-19 RX ADMIN — IBUPROFEN 600 MG: 600 TABLET, FILM COATED ORAL at 15:39

## 2022-11-19 RX ADMIN — ACETAMINOPHEN 650 MG: 325 TABLET ORAL at 12:04

## 2022-11-19 ASSESSMENT — PAIN SCALES - GENERAL
PAINLEVEL_OUTOF10: 3
PAINLEVEL_OUTOF10: 0
PAINLEVEL_OUTOF10: 2
PAINLEVEL_OUTOF10: 3
PAINLEVEL_OUTOF10: 5

## 2022-11-20 VITALS
SYSTOLIC BLOOD PRESSURE: 117 MMHG | HEART RATE: 110 BPM | RESPIRATION RATE: 16 BRPM | HEIGHT: 67 IN | BODY MASS INDEX: 29.51 KG/M2 | TEMPERATURE: 98.1 F | WEIGHT: 188 LBS | DIASTOLIC BLOOD PRESSURE: 78 MMHG | OXYGEN SATURATION: 98 %

## 2022-11-20 PROBLEM — D50.9 IRON DEFICIENCY ANEMIA: Status: RESOLVED | Noted: 2022-10-31 | Resolved: 2022-11-20

## 2022-11-20 PROBLEM — Z37.9 NORMAL LABOR: Status: RESOLVED | Noted: 2022-11-18 | Resolved: 2022-11-20

## 2022-11-20 PROBLEM — Z22.330 GBS CARRIER: Status: RESOLVED | Noted: 2022-11-04 | Resolved: 2022-11-20

## 2022-11-20 PROCEDURE — 10002803 HB RX 637: Performed by: OBSTETRICS & GYNECOLOGY

## 2022-11-20 PROCEDURE — 99024 POSTOP FOLLOW-UP VISIT: CPT | Performed by: OBSTETRICS & GYNECOLOGY

## 2022-11-20 PROCEDURE — S9445 PT EDUCATION NOC INDIVID: HCPCS

## 2022-11-20 RX ADMIN — IBUPROFEN 600 MG: 600 TABLET, FILM COATED ORAL at 06:44

## 2022-11-20 RX ADMIN — VITAMIN A, VITAMIN C, VITAMIN D, VITAMIN E, THIAMINE, RIBOFLAVIN, NIACIN, VITAMIN B6, FOLIC ACID, VITAMIN B12, CALCIUM, IRON, ZINC, COPPER 1 TABLET: 4000; 120; 400; 22; 1.84; 3; 20; 10; 1; 12; 200; 27; 25; 2 TABLET ORAL at 08:16

## 2022-11-20 RX ADMIN — IBUPROFEN 600 MG: 600 TABLET, FILM COATED ORAL at 12:47

## 2022-11-20 ASSESSMENT — PAIN SCALES - GENERAL
PAINLEVEL_OUTOF10: 2
PAINLEVEL_OUTOF10: 3
PAINLEVEL_OUTOF10: 6

## 2023-01-03 ENCOUNTER — POSTPARTUM VISIT (OUTPATIENT)
Dept: OBGYN | Age: 33
End: 2023-01-03

## 2023-01-03 VITALS
TEMPERATURE: 98.4 F | DIASTOLIC BLOOD PRESSURE: 81 MMHG | HEART RATE: 96 BPM | WEIGHT: 161 LBS | BODY MASS INDEX: 25.22 KG/M2 | SYSTOLIC BLOOD PRESSURE: 115 MMHG

## 2023-01-03 DIAGNOSIS — Z14.8 GENETIC CARRIER STATUS: ICD-10-CM

## 2023-01-03 PROCEDURE — 0503F POSTPARTUM CARE VISIT: CPT | Performed by: OBSTETRICS & GYNECOLOGY

## 2023-01-03 ASSESSMENT — EDINBURGH POSTNATAL DEPRESSION SCALE (EPDS)
I HAVE BEEN ABLE TO LAUGH AND SEE THE FUNNY SIDE OF THINGS: AS MUCH AS I ALWAYS COULD
I HAVE LOOKED FORWARD WITH ENJOYMENT TO THINGS: AS MUCH AS I EVER DID
I HAVE BEEN SO UNHAPPY THAT I HAVE BEEN CRYING: ONLY OCCASIONALLY
I HAVE FELT SCARED OR PANICKY FOR NO GOOD REASON: NO, NOT MUCH
I HAVE BLAMED MYSELF UNNECESSARILY WHEN THINGS WENT WRONG: NO, NEVER
I HAVE FELT SAD OR MISERABLE: NOT VERY OFTEN
THE THOUGHT OF HARMING MYSELF HAS OCCURRED TO ME: NEVER
I HAVE BEEN ANXIOUS OR WORRIED FOR NO GOOD REASON: YES, SOMETIMES
THINGS HAVE BEEN GETTING ON TOP OF ME: YES, SOMETIMES I HAVEN'T BEEN COPING AS WELL AS USUAL
I HAVE BEEN SO UNHAPPY THAT I HAVE HAD DIFFICULTY SLEEPING: NOT VERY OFTEN
TOTAL SCORE: 8

## 2023-01-23 ENCOUNTER — APPOINTMENT (OUTPATIENT)
Dept: OBGYN | Age: 33
End: 2023-01-23

## 2023-05-04 ENCOUNTER — OFFICE VISIT (OUTPATIENT)
Dept: FAMILY MEDICINE CLINIC | Facility: CLINIC | Age: 33
End: 2023-05-04

## 2023-05-04 VITALS
BODY MASS INDEX: 26.06 KG/M2 | WEIGHT: 166 LBS | HEIGHT: 67 IN | DIASTOLIC BLOOD PRESSURE: 86 MMHG | HEART RATE: 80 BPM | SYSTOLIC BLOOD PRESSURE: 125 MMHG

## 2023-05-04 DIAGNOSIS — Z01.419 WELL WOMAN EXAM: Primary | ICD-10-CM

## 2023-05-04 DIAGNOSIS — K59.01 SLOW TRANSIT CONSTIPATION: ICD-10-CM

## 2023-05-04 DIAGNOSIS — Z30.014 ENCOUNTER FOR INITIAL PRESCRIPTION OF INTRAUTERINE CONTRACEPTIVE DEVICE (IUD): ICD-10-CM

## 2023-05-04 PROCEDURE — 3008F BODY MASS INDEX DOCD: CPT | Performed by: NURSE PRACTITIONER

## 2023-05-04 PROCEDURE — 3079F DIAST BP 80-89 MM HG: CPT | Performed by: NURSE PRACTITIONER

## 2023-05-04 PROCEDURE — 99385 PREV VISIT NEW AGE 18-39: CPT | Performed by: NURSE PRACTITIONER

## 2023-05-04 PROCEDURE — 3074F SYST BP LT 130 MM HG: CPT | Performed by: NURSE PRACTITIONER

## 2023-05-04 PROCEDURE — 99203 OFFICE O/P NEW LOW 30 MIN: CPT | Performed by: NURSE PRACTITIONER

## 2023-05-04 NOTE — ASSESSMENT & PLAN NOTE
Discussed w pt nipple care  Discussed weaning off of pumping breast milk  Please let me know if you have any questions or concerns.

## 2023-05-04 NOTE — ASSESSMENT & PLAN NOTE
Screening labs ordered  Please aim to eat a diet high in fresh fruits and vegetables, lean protein sources, complex carbohydrates and limited processed and fast foods. Try to get at least 150 minutes of exercise per week-a combination of weight resistance and cardio is preferred.     Referral placed for iud insertion w ob gyne

## 2023-05-04 NOTE — ASSESSMENT & PLAN NOTE
Referral placed for ob gyne  Has appointment for iud insertion w Dr Kaur Padron  Currently not sexually active

## 2023-05-04 NOTE — ASSESSMENT & PLAN NOTE
Discussed w pt improving diet-add more fiber via fruits, vegetables  Increase water intake  Exercise for goal of at least 150 min/week; combination weight training and cardio exercises

## 2023-05-15 ENCOUNTER — OFFICE VISIT (OUTPATIENT)
Dept: OBGYN | Age: 33
End: 2023-05-15

## 2023-05-15 VITALS
TEMPERATURE: 98.2 F | RESPIRATION RATE: 12 BRPM | SYSTOLIC BLOOD PRESSURE: 129 MMHG | HEART RATE: 87 BPM | OXYGEN SATURATION: 99 % | DIASTOLIC BLOOD PRESSURE: 79 MMHG | BODY MASS INDEX: 26.01 KG/M2 | WEIGHT: 165.7 LBS | HEIGHT: 67 IN

## 2023-05-15 DIAGNOSIS — Z01.812 PRE-PROCEDURE LAB EXAM: ICD-10-CM

## 2023-05-15 DIAGNOSIS — Z30.430 ENCOUNTER FOR IUD INSERTION: Primary | ICD-10-CM

## 2023-05-15 LAB
B-HCG UR QL: NEGATIVE
INTERNAL PROCEDURAL CONTROLS ACCEPTABLE: YES
TEST LOT EXPIRATION DATE: NORMAL
TEST LOT NUMBER: NORMAL

## 2023-05-15 PROCEDURE — 58300 INSERT INTRAUTERINE DEVICE: CPT | Performed by: OBSTETRICS & GYNECOLOGY

## 2023-05-15 PROCEDURE — 81025 URINE PREGNANCY TEST: CPT | Performed by: OBSTETRICS & GYNECOLOGY

## 2023-05-15 ASSESSMENT — PAIN SCALES - GENERAL: PAINLEVEL: 0

## 2023-05-17 ENCOUNTER — LAB ENCOUNTER (OUTPATIENT)
Dept: LAB | Age: 33
End: 2023-05-17
Attending: NURSE PRACTITIONER
Payer: MEDICAID

## 2023-05-17 DIAGNOSIS — Z01.419 WELL WOMAN EXAM: ICD-10-CM

## 2023-05-17 LAB
ALBUMIN SERPL-MCNC: 3.8 G/DL (ref 3.4–5)
ALBUMIN/GLOB SERPL: 1 {RATIO} (ref 1–2)
ALP LIVER SERPL-CCNC: 73 U/L
ALT SERPL-CCNC: 40 U/L
ANION GAP SERPL CALC-SCNC: 5 MMOL/L (ref 0–18)
AST SERPL-CCNC: 21 U/L (ref 15–37)
BILIRUB SERPL-MCNC: 0.8 MG/DL (ref 0.1–2)
BUN BLD-MCNC: 15 MG/DL (ref 7–18)
BUN/CREAT SERPL: 22.7 (ref 10–20)
CALCIUM BLD-MCNC: 9.7 MG/DL (ref 8.5–10.1)
CHLORIDE SERPL-SCNC: 106 MMOL/L (ref 98–112)
CHOLEST SERPL-MCNC: 207 MG/DL (ref ?–200)
CO2 SERPL-SCNC: 27 MMOL/L (ref 21–32)
CREAT BLD-MCNC: 0.66 MG/DL
DEPRECATED RDW RBC AUTO: 34.9 FL (ref 35.1–46.3)
ERYTHROCYTE [DISTWIDTH] IN BLOOD BY AUTOMATED COUNT: 10.9 % (ref 11–15)
EST. AVERAGE GLUCOSE BLD GHB EST-MCNC: 108 MG/DL (ref 68–126)
FASTING PATIENT LIPID ANSWER: YES
FASTING STATUS PATIENT QL REPORTED: YES
GFR SERPLBLD BASED ON 1.73 SQ M-ARVRAT: 120 ML/MIN/1.73M2 (ref 60–?)
GLOBULIN PLAS-MCNC: 4 G/DL (ref 2.8–4.4)
GLUCOSE BLD-MCNC: 97 MG/DL (ref 70–99)
HBA1C MFR BLD: 5.4 % (ref ?–5.7)
HCT VFR BLD AUTO: 41 %
HDLC SERPL-MCNC: 79 MG/DL (ref 40–59)
HGB BLD-MCNC: 13.3 G/DL
LDLC SERPL CALC-MCNC: 109 MG/DL (ref ?–100)
MCH RBC QN AUTO: 28.7 PG (ref 26–34)
MCHC RBC AUTO-ENTMCNC: 32.4 G/DL (ref 31–37)
MCV RBC AUTO: 88.4 FL
NONHDLC SERPL-MCNC: 128 MG/DL (ref ?–130)
OSMOLALITY SERPL CALC.SUM OF ELEC: 287 MOSM/KG (ref 275–295)
PLATELET # BLD AUTO: 215 10(3)UL (ref 150–450)
POTASSIUM SERPL-SCNC: 4.2 MMOL/L (ref 3.5–5.1)
PROT SERPL-MCNC: 7.8 G/DL (ref 6.4–8.2)
RBC # BLD AUTO: 4.64 X10(6)UL
SODIUM SERPL-SCNC: 138 MMOL/L (ref 136–145)
T3FREE SERPL-MCNC: 5.02 PG/ML (ref 2.4–4.2)
T4 FREE SERPL-MCNC: 1.3 NG/DL (ref 0.8–1.7)
TRIGL SERPL-MCNC: 111 MG/DL (ref 30–149)
TSI SER-ACNC: <0.005 MIU/ML (ref 0.36–3.74)
VIT B12 SERPL-MCNC: 419 PG/ML (ref 193–986)
VIT D+METAB SERPL-MCNC: 28.4 NG/ML (ref 30–100)
VLDLC SERPL CALC-MCNC: 19 MG/DL (ref 0–30)
WBC # BLD AUTO: 6.6 X10(3) UL (ref 4–11)

## 2023-05-17 PROCEDURE — 84481 FREE ASSAY (FT-3): CPT

## 2023-05-17 PROCEDURE — 80053 COMPREHEN METABOLIC PANEL: CPT

## 2023-05-17 PROCEDURE — 84439 ASSAY OF FREE THYROXINE: CPT

## 2023-05-17 PROCEDURE — 83036 HEMOGLOBIN GLYCOSYLATED A1C: CPT

## 2023-05-17 PROCEDURE — 82306 VITAMIN D 25 HYDROXY: CPT | Performed by: NURSE PRACTITIONER

## 2023-05-17 PROCEDURE — 85027 COMPLETE CBC AUTOMATED: CPT

## 2023-05-17 PROCEDURE — 36415 COLL VENOUS BLD VENIPUNCTURE: CPT

## 2023-05-17 PROCEDURE — 80061 LIPID PANEL: CPT

## 2023-05-17 PROCEDURE — 82607 VITAMIN B-12: CPT

## 2023-05-17 PROCEDURE — 84443 ASSAY THYROID STIM HORMONE: CPT

## 2023-05-22 ENCOUNTER — TELEPHONE (OUTPATIENT)
Dept: FAMILY MEDICINE CLINIC | Facility: CLINIC | Age: 33
End: 2023-05-22

## 2023-05-22 DIAGNOSIS — E05.90 HYPERTHYROIDISM: Primary | ICD-10-CM

## 2023-05-22 RX ORDER — METHIMAZOLE 10 MG/1
10 TABLET ORAL DAILY
Qty: 90 TABLET | Refills: 3 | Status: SHIPPED | OUTPATIENT
Start: 2023-05-22 | End: 2024-05-16

## 2023-05-22 NOTE — TELEPHONE ENCOUNTER
Patient called (identified name and ),   Saw message about her overactive thyroid. Ultrasound scheduled for . Asking Troy EVANGELISTA if she needs to start methimazole today or wait until ultrasound of thyroid is done? Please advise.       Future Appointments   Date Time Provider Phillip Price   2023  4:30 PM [facility] [address] [facility]

## 2023-06-13 PROBLEM — D06.9 HIGH GRADE SQUAMOUS INTRAEPITHELIAL LESION (HGSIL), GRADE 3 CIN, ON BIOPSY OF CERVIX: Status: ACTIVE | Noted: 2023-06-13

## 2023-06-13 PROBLEM — Z97.5 PRESENCE OF INTRAUTERINE CONTRACEPTIVE DEVICE: Status: ACTIVE | Noted: 2023-06-13

## 2023-06-14 ENCOUNTER — OFFICE VISIT (OUTPATIENT)
Dept: OBGYN | Age: 33
End: 2023-06-14

## 2023-06-14 VITALS
WEIGHT: 160.27 LBS | HEART RATE: 87 BPM | OXYGEN SATURATION: 96 % | SYSTOLIC BLOOD PRESSURE: 114 MMHG | HEIGHT: 67 IN | TEMPERATURE: 98.1 F | DIASTOLIC BLOOD PRESSURE: 82 MMHG | BODY MASS INDEX: 25.16 KG/M2

## 2023-06-14 DIAGNOSIS — D06.9 HIGH GRADE SQUAMOUS INTRAEPITHELIAL LESION (HGSIL), GRADE 3 CIN, ON BIOPSY OF CERVIX: ICD-10-CM

## 2023-06-14 DIAGNOSIS — Z97.5 PRESENCE OF INTRAUTERINE CONTRACEPTIVE DEVICE: ICD-10-CM

## 2023-06-14 DIAGNOSIS — Z01.419 GYNECOLOGIC EXAM NORMAL: Primary | ICD-10-CM

## 2023-06-14 PROCEDURE — 88175 CYTOPATH C/V AUTO FLUID REDO: CPT | Performed by: INTERNAL MEDICINE

## 2023-06-14 PROCEDURE — 87625 HPV TYPES 16 & 18 ONLY: CPT | Performed by: INTERNAL MEDICINE

## 2023-06-14 PROCEDURE — 87624 HPV HI-RISK TYP POOLED RSLT: CPT | Performed by: INTERNAL MEDICINE

## 2023-06-14 PROCEDURE — 99395 PREV VISIT EST AGE 18-39: CPT | Performed by: OBSTETRICS & GYNECOLOGY

## 2023-06-14 RX ORDER — METHIMAZOLE 10 MG/1
TABLET ORAL
COMMUNITY
Start: 2023-05-23

## 2023-06-16 LAB
CASE RPRT: NORMAL
CLINICAL INFO: NORMAL
CYTOLOGY CVX/VAG STUDY: NORMAL
HPV16+18+45 E6+E7MRNA CVX NAA+PROBE: POSITIVE
Lab: ABNORMAL
PAP EDUCATIONAL NOTE: NORMAL
SPECIMEN ADEQUACY: NORMAL

## 2023-06-19 PROBLEM — R87.810 PAPANICOLAOU SMEAR OF CERVIX WITH POSITIVE HIGH RISK HUMAN PAPILLOMA VIRUS (HPV) TEST: Status: ACTIVE | Noted: 2023-06-19

## 2023-06-19 PROBLEM — R87.618 PAP SMEAR ABNORMALITY OF CERVIX/HUMAN PAPILLOMAVIRUS (HPV) POSITIVE: Status: ACTIVE | Noted: 2023-06-19

## 2023-06-20 ENCOUNTER — HOSPITAL ENCOUNTER (OUTPATIENT)
Dept: ULTRASOUND IMAGING | Facility: HOSPITAL | Age: 33
Discharge: HOME | End: 2023-06-20
Attending: NURSE PRACTITIONER
Payer: MEDICAID

## 2023-06-20 DIAGNOSIS — E05.90 HYPERTHYROIDISM: ICD-10-CM

## 2023-06-20 LAB
HPV GENOTYPE 16: NEGATIVE
HPV GENOTYPE 18/45: NEGATIVE
Lab: NORMAL

## 2023-06-20 PROCEDURE — 76536 US EXAM OF HEAD AND NECK: CPT | Performed by: NURSE PRACTITIONER

## 2023-06-21 DIAGNOSIS — E04.2 MULTIPLE THYROID NODULES: ICD-10-CM

## 2023-06-21 DIAGNOSIS — E05.90 HYPERTHYROIDISM: Primary | ICD-10-CM

## 2023-07-06 ENCOUNTER — OFFICE VISIT (OUTPATIENT)
Dept: OBGYN | Age: 33
End: 2023-07-06

## 2023-07-06 ENCOUNTER — APPOINTMENT (OUTPATIENT)
Dept: OBGYN | Age: 33
End: 2023-07-06

## 2023-07-06 VITALS
HEIGHT: 67 IN | DIASTOLIC BLOOD PRESSURE: 68 MMHG | SYSTOLIC BLOOD PRESSURE: 100 MMHG | BODY MASS INDEX: 25.1 KG/M2 | TEMPERATURE: 97.9 F

## 2023-07-06 DIAGNOSIS — Z98.890 HISTORY OF LOOP ELECTROSURGICAL EXCISION PROCEDURE (LEEP) OF CERVIX AFFECTING PREGNANCY, ANTEPARTUM: ICD-10-CM

## 2023-07-06 DIAGNOSIS — O34.40 HISTORY OF LOOP ELECTROSURGICAL EXCISION PROCEDURE (LEEP) OF CERVIX AFFECTING PREGNANCY, ANTEPARTUM: ICD-10-CM

## 2023-07-06 DIAGNOSIS — Z01.818 PRE-OP TESTING: ICD-10-CM

## 2023-07-06 DIAGNOSIS — R87.810 PAPANICOLAOU SMEAR OF CERVIX WITH POSITIVE HIGH RISK HUMAN PAPILLOMA VIRUS (HPV) TEST: Primary | ICD-10-CM

## 2023-07-06 PROCEDURE — 57454 BX/CURETT OF CERVIX W/SCOPE: CPT | Performed by: OBSTETRICS & GYNECOLOGY

## 2023-07-06 PROCEDURE — 88305 TISSUE EXAM BY PATHOLOGIST: CPT | Performed by: INTERNAL MEDICINE

## 2023-07-06 PROCEDURE — 81025 URINE PREGNANCY TEST: CPT | Performed by: OBSTETRICS & GYNECOLOGY

## 2023-07-13 LAB
ASR DISCLAIMER: NORMAL
CASE RPRT: NORMAL
CLINICAL INFO: NORMAL
PATH REPORT.FINAL DX SPEC: NORMAL
PATH REPORT.GROSS SPEC: NORMAL

## 2023-08-31 ENCOUNTER — OFFICE VISIT (OUTPATIENT)
Dept: ENDOCRINOLOGY CLINIC | Facility: CLINIC | Age: 33
End: 2023-08-31

## 2023-08-31 VITALS
HEIGHT: 67 IN | WEIGHT: 169.63 LBS | HEART RATE: 82 BPM | DIASTOLIC BLOOD PRESSURE: 74 MMHG | BODY MASS INDEX: 26.62 KG/M2 | SYSTOLIC BLOOD PRESSURE: 114 MMHG

## 2023-08-31 DIAGNOSIS — E04.2 MULTINODULAR GOITER: ICD-10-CM

## 2023-08-31 DIAGNOSIS — E05.90 THYROTOXICOSIS WITHOUT THYROID STORM, UNSPECIFIED THYROTOXICOSIS TYPE: Primary | ICD-10-CM

## 2023-08-31 PROCEDURE — 3078F DIAST BP <80 MM HG: CPT | Performed by: INTERNAL MEDICINE

## 2023-08-31 PROCEDURE — 3074F SYST BP LT 130 MM HG: CPT | Performed by: INTERNAL MEDICINE

## 2023-08-31 PROCEDURE — 3008F BODY MASS INDEX DOCD: CPT | Performed by: INTERNAL MEDICINE

## 2023-08-31 PROCEDURE — 99244 OFF/OP CNSLTJ NEW/EST MOD 40: CPT | Performed by: INTERNAL MEDICINE

## 2023-09-11 ENCOUNTER — LAB ENCOUNTER (OUTPATIENT)
Dept: LAB | Age: 33
End: 2023-09-11
Attending: INTERNAL MEDICINE
Payer: MEDICAID

## 2023-09-11 DIAGNOSIS — E05.90 THYROTOXICOSIS WITHOUT THYROID STORM, UNSPECIFIED THYROTOXICOSIS TYPE: ICD-10-CM

## 2023-09-11 LAB
ALBUMIN SERPL-MCNC: 4.1 G/DL (ref 3.4–5)
ALBUMIN/GLOB SERPL: 1.1 {RATIO} (ref 1–2)
ALP LIVER SERPL-CCNC: 89 U/L
ALT SERPL-CCNC: 29 U/L
ANION GAP SERPL CALC-SCNC: 3 MMOL/L (ref 0–18)
AST SERPL-CCNC: 16 U/L (ref 15–37)
BASOPHILS # BLD AUTO: 0.03 X10(3) UL (ref 0–0.2)
BASOPHILS NFR BLD AUTO: 0.4 %
BILIRUB SERPL-MCNC: 0.6 MG/DL (ref 0.1–2)
BUN BLD-MCNC: 14 MG/DL (ref 7–18)
CALCIUM BLD-MCNC: 9.1 MG/DL (ref 8.5–10.1)
CHLORIDE SERPL-SCNC: 107 MMOL/L (ref 98–112)
CO2 SERPL-SCNC: 29 MMOL/L (ref 21–32)
CREAT BLD-MCNC: 0.92 MG/DL
EGFRCR SERPLBLD CKD-EPI 2021: 85 ML/MIN/1.73M2 (ref 60–?)
EOSINOPHIL # BLD AUTO: 0.06 X10(3) UL (ref 0–0.7)
EOSINOPHIL NFR BLD AUTO: 0.9 %
ERYTHROCYTE [DISTWIDTH] IN BLOOD BY AUTOMATED COUNT: 11.5 %
FASTING STATUS PATIENT QL REPORTED: NO
GLOBULIN PLAS-MCNC: 3.8 G/DL (ref 2.8–4.4)
GLUCOSE BLD-MCNC: 113 MG/DL (ref 70–99)
HCT VFR BLD AUTO: 43.3 %
HGB BLD-MCNC: 13.8 G/DL
IMM GRANULOCYTES # BLD AUTO: 0.01 X10(3) UL (ref 0–1)
IMM GRANULOCYTES NFR BLD: 0.1 %
LYMPHOCYTES # BLD AUTO: 2.64 X10(3) UL (ref 1–4)
LYMPHOCYTES NFR BLD AUTO: 38 %
MCH RBC QN AUTO: 28.9 PG (ref 26–34)
MCHC RBC AUTO-ENTMCNC: 31.9 G/DL (ref 31–37)
MCV RBC AUTO: 90.8 FL
MONOCYTES # BLD AUTO: 0.58 X10(3) UL (ref 0.1–1)
MONOCYTES NFR BLD AUTO: 8.4 %
NEUTROPHILS # BLD AUTO: 3.62 X10 (3) UL (ref 1.5–7.7)
NEUTROPHILS # BLD AUTO: 3.62 X10(3) UL (ref 1.5–7.7)
NEUTROPHILS NFR BLD AUTO: 52.2 %
OSMOLALITY SERPL CALC.SUM OF ELEC: 289 MOSM/KG (ref 275–295)
PLATELET # BLD AUTO: 216 10(3)UL (ref 150–450)
POTASSIUM SERPL-SCNC: 4.1 MMOL/L (ref 3.5–5.1)
PROT SERPL-MCNC: 7.9 G/DL (ref 6.4–8.2)
RBC # BLD AUTO: 4.77 X10(6)UL
SODIUM SERPL-SCNC: 139 MMOL/L (ref 136–145)
T3FREE SERPL-MCNC: 2.43 PG/ML (ref 2.4–4.2)
T4 FREE SERPL-MCNC: 0.7 NG/DL (ref 0.8–1.7)
THYROGLOB SERPL-MCNC: 27 U/ML (ref ?–60)
THYROPEROXIDASE AB SERPL-ACNC: 28 U/ML (ref ?–60)
TSI SER-ACNC: 2.3 MIU/ML (ref 0.36–3.74)
WBC # BLD AUTO: 6.9 X10(3) UL (ref 4–11)

## 2023-09-11 PROCEDURE — 86376 MICROSOMAL ANTIBODY EACH: CPT

## 2023-09-11 PROCEDURE — 86800 THYROGLOBULIN ANTIBODY: CPT

## 2023-09-11 PROCEDURE — 85025 COMPLETE CBC W/AUTO DIFF WBC: CPT

## 2023-09-11 PROCEDURE — 84445 ASSAY OF TSI GLOBULIN: CPT

## 2023-09-11 PROCEDURE — 80053 COMPREHEN METABOLIC PANEL: CPT

## 2023-09-11 PROCEDURE — 84481 FREE ASSAY (FT-3): CPT

## 2023-09-11 PROCEDURE — 84439 ASSAY OF FREE THYROXINE: CPT

## 2023-09-11 PROCEDURE — 84443 ASSAY THYROID STIM HORMONE: CPT

## 2023-09-11 PROCEDURE — 36415 COLL VENOUS BLD VENIPUNCTURE: CPT

## 2023-09-13 LAB — THY STIM IMMUNO: <0.1 IU/L

## 2024-02-20 ENCOUNTER — OFFICE VISIT (OUTPATIENT)
Dept: OBGYN CLINIC | Facility: CLINIC | Age: 34
End: 2024-02-20
Payer: COMMERCIAL

## 2024-02-20 VITALS
HEART RATE: 94 BPM | DIASTOLIC BLOOD PRESSURE: 80 MMHG | BODY MASS INDEX: 26 KG/M2 | WEIGHT: 167 LBS | SYSTOLIC BLOOD PRESSURE: 117 MMHG

## 2024-02-20 DIAGNOSIS — N93.9 ABNORMAL UTERINE BLEEDING (AUB): Primary | ICD-10-CM

## 2024-02-20 DIAGNOSIS — N94.10 DYSPAREUNIA IN FEMALE: ICD-10-CM

## 2024-02-20 DIAGNOSIS — Z97.5 IUD (INTRAUTERINE DEVICE) IN PLACE: ICD-10-CM

## 2024-02-20 PROCEDURE — 3079F DIAST BP 80-89 MM HG: CPT | Performed by: NURSE PRACTITIONER

## 2024-02-20 PROCEDURE — 3074F SYST BP LT 130 MM HG: CPT | Performed by: NURSE PRACTITIONER

## 2024-02-20 PROCEDURE — 99213 OFFICE O/P EST LOW 20 MIN: CPT | Performed by: NURSE PRACTITIONER

## 2024-02-20 NOTE — PROGRESS NOTES
[facility]   Obstetrics and Gynecology    Veronica Rosales is a 33 year old female  Patient's last menstrual period was 2024.   Chief Complaint   Patient presents with    Gyn Problem     Pt reports two periods in January month. Pt reports she has thyroid issues also   . New patient here for period concerns. She had her period after stopped nursing in 2023. Had very heavy periods prior to pregnancy.  Since periods returned- has 2 days of cramping and regular and lighter with mirena IUD. She reports some episodes of spotting between her period. Then had an episode of bleeding beginning of February after period in mid January and lasted 5 days.  She also has pain with intercourse with insertion near her stitches/tear from her delivery    Has seen endocrine last year. Had US of thyroid US. Has been taking methimazole 10 mg  daily. Needs repeat labs.  She had a baby in 2022 . Thyroid issue started after.  Stopped breast feeding in 2023    Pap:2023 NILM, HPV +; colpo with Dr. Loza benign    Contraception: IUD mirena - placed in 2023    OBSTETRICS HISTORY:  OB History    Para Term  AB Living   1 1 1 0 0 1   SAB IAB Ectopic Multiple Live Births   0 0 0 0 1       GYNE HISTORY:  No data recorded    History   Sexual Activity    Sexual activity: Not on file       MEDICAL HISTORY:  Past Medical History:   Diagnosis Date    Hyperthyroidism        SOCIAL HISTORY:  Social History     Socioeconomic History    Marital status: Single     Spouse name: Not on file    Number of children: Not on file    Years of education: Not on file    Highest education level: Not on file   Occupational History    Not on file   Tobacco Use    Smoking status: Never    Smokeless tobacco: Never   Substance and Sexual Activity    Alcohol use: Not Currently     Comment: rare    Drug use: Never    Sexual activity: Not on file   Other Topics Concern    Not on file   Social History Narrative    Not on file      Social Determinants of Health     Financial Resource Strain: Not on file   Food Insecurity: Not on file   Transportation Needs: Not on file   Physical Activity: Not on file   Stress: Not on file   Social Connections: Not on file   Housing Stability: Not on file       MEDICATIONS:    Current Outpatient Medications:     Levonorgestrel 20 MCG/DAY Intrauterine IUD, 52,000 mcg by Intrauterine route., Disp: , Rfl:     methIMAzole 10 MG Oral Tab, Take 1 tablet (10 mg total) by mouth daily., Disp: 90 tablet, Rfl: 3    ALLERGIES:  No Known Allergies      Review of Systems:  Constitutional:  Denies fatigue, night sweats, hot flashes  Cardiovascular:  denies chest pain or palpitations  Respiratory:  denies shortness of breath  Gastrointestinal:  denies heartburn, abdominal pain, diarrhea or constipation  Genitourinary:  denies dysuria, incontinence, abnormal vaginal discharge, vaginal itching see HPI  Musculoskeletal:  denies back pain.  Skin/Breast:  Denies any breast pain, lumps, or discharge.   Neurological:  denies headaches, extremity weakness or numbness.  Psychiatric: denies depression or anxiety.  Endocrine:   denies excessive thirst or urination.  Heme/Lymph:  denies history of anemia, easy bruising or bleeding.      PHYSICAL EXAM:     Vitals:    02/20/24 1516   BP: 117/80   Pulse: 94   Weight: 167 lb (75.8 kg)     Body mass index is 26.16 kg/m².     Constitutional: well developed, well nourished    Psychiatric:  Oriented to time, place, person and situation. Appropriate mood and affect    Pelvic Exam:  External Genitalia: scar seen at posterior introitus healed and well approximated; normal appearance, hair distribution, and no lesions  Urethral Meatus:  normal in size, location, without lesions and prolapse  Bladder:  No fullness, masses or tenderness  Vagina:  Normal appearance without lesions, no abnormal discharge  Cervix:  +IUD strings, Normal without tenderness on motion  Uterus: normal in size, contour,  position, mobility, without tenderness  Adnexa: normal without masses or tenderness  Perineum: normal  Anus: no hemorroids   Lymph node: no inguinal lymph nodes    Assessment & Plan:  Veronica was seen today for gyn problem.    Diagnoses and all orders for this visit:    Abnormal uterine bleeding (AUB)  -     US PELVIS W EV (CPT=76856/64409); Future    IUD (intrauterine device) in place  -     US PELVIS W EV (CPT=76856/23914); Future    Dyspareunia in female    IUD appears in situ  Will get US due to aub    Due to dyspareunia - advised trial of lubrication and aquaphor to area to see if improves symptoms    Rto in 7/2023 for annual and pap      JEAN CARLOS Stewart    This note was prepared using Dragon Medical voice recognition dictation software. As a result errors may occur. When identified these errors have been corrected. While every attempt is made to correct errors during dictation discrepancies may still exist.

## 2024-02-21 LAB
BV BACTERIA DNA VAG QL NAA+PROBE: NEGATIVE
C GLABRATA DNA VAG QL NAA+PROBE: NEGATIVE
C KRUSEI DNA VAG QL NAA+PROBE: NEGATIVE
C TRACH DNA SPEC QL NAA+PROBE: NEGATIVE
CANDIDA DNA VAG QL NAA+PROBE: NEGATIVE
N GONORRHOEA DNA SPEC QL NAA+PROBE: NEGATIVE
T VAGINALIS DNA VAG QL NAA+PROBE: NEGATIVE

## 2024-04-08 ENCOUNTER — HOSPITAL ENCOUNTER (OUTPATIENT)
Dept: ULTRASOUND IMAGING | Age: 34
Discharge: HOME | End: 2024-04-08
Attending: NURSE PRACTITIONER
Payer: COMMERCIAL

## 2024-04-08 DIAGNOSIS — N93.9 ABNORMAL UTERINE BLEEDING (AUB): ICD-10-CM

## 2024-04-08 DIAGNOSIS — Z97.5 IUD (INTRAUTERINE DEVICE) IN PLACE: ICD-10-CM

## 2024-04-08 PROCEDURE — 76856 US EXAM PELVIC COMPLETE: CPT | Performed by: NURSE PRACTITIONER

## 2024-04-08 PROCEDURE — 76830 TRANSVAGINAL US NON-OB: CPT | Performed by: NURSE PRACTITIONER

## 2024-04-10 ENCOUNTER — TELEPHONE (OUTPATIENT)
Dept: OBGYN CLINIC | Facility: CLINIC | Age: 34
End: 2024-04-10

## 2024-04-10 NOTE — TELEPHONE ENCOUNTER
Pt scheduled Mirena IUD exchange with [facility] for 4/18. Pt advised she will need to do UPT day of procedure. Pt asking if mirena exchange requires authorization from her insurance. Message to Yanet to please advise.

## 2024-04-10 NOTE — TELEPHONE ENCOUNTER
LMP 2 days ago- heavy and painful. Rev'd US below. Had AUB at last visit. IUD appears to be within the RICHARD and endocervical canal.  Recommend removal and replacement. Rev'd condoms if sexually active as IUD not effective due to position.    Impression   CONCLUSION:  1. Normal appearing anteverted uterus.  Normal thickness endometrium.  2. IUD within the lower uterine segment and endocervical canal 2.9 cm from the distal fundal tip.  Position is considered less than optimal.  3. Normal bilateral ovaries.  Subcentimeter benign follicular cyst left ovary.             Dictated by (CST): Varun De Jesus MD on 4/08/2024 at 5:54 PM      Finalized by (CST): Varun De Jesus MD on 4/08/2024 at 5:59 PM         Nurses please call to schedule patient for IUD removal and replacement of new mirena. Need upt day of appt.

## 2024-04-18 ENCOUNTER — OFFICE VISIT (OUTPATIENT)
Dept: OBGYN CLINIC | Facility: CLINIC | Age: 34
End: 2024-04-18

## 2024-04-18 VITALS
DIASTOLIC BLOOD PRESSURE: 77 MMHG | SYSTOLIC BLOOD PRESSURE: 118 MMHG | WEIGHT: 161.38 LBS | BODY MASS INDEX: 25 KG/M2 | HEART RATE: 72 BPM

## 2024-04-18 DIAGNOSIS — Z30.433 ENCOUNTER FOR IUD REMOVAL AND REINSERTION: Primary | ICD-10-CM

## 2024-04-18 DIAGNOSIS — Z32.02 PREGNANCY EXAMINATION OR TEST, NEGATIVE RESULT: ICD-10-CM

## 2024-04-18 DIAGNOSIS — T83.32XA MALPOSITIONED INTRAUTERINE DEVICE (IUD), INITIAL ENCOUNTER: ICD-10-CM

## 2024-04-18 LAB
CONTROL LINE PRESENT WITH A CLEAR BACKGROUND (YES/NO): YES YES/NO
KIT LOT #: NORMAL NUMERIC

## 2024-04-18 NOTE — PATIENT INSTRUCTIONS
IUD Take-Home Sheet      Progestin IUD (Mirena®,)   It begins working in 7 days to prevent pregnancy.    You MUST use condoms for the first 7 days after your IUD was inserted. If you have sex without using a condom, you will need to take emergency contraception as soon as possible to prevent pregnancy.    Mirena® can stay inside you for 7 years. Malena® can stay inside you for 3 years. Liletta® can stay inside you for 7 years. Kyleena® can stay inside you for 5 years.   Removal date  ________ (8 years from today)    Today you may go back to school or work after your visit. You must wait 24 hours after your IUD is put in before you can use tampons, take a bath, or have vaginal sex.    You may have more cramps or heavier bleeding with your periods, or spotting between your periods. This is normal. The cramping and bleeding can last for 3-6 months with the Mirena®, Liletta®, and Malena® (hormone) IUDs. After 6 months, the cramping and bleeding should get better. Many women will stop having periods after 1 or 2 years with the Mirena®, Liletta®, Kyleena®, and Malena® (hormone) IUDs. If you have the Paragard® (copper) IUD, you may have more cramping and more bleeding with your periods as long as you have the IUD inside you.    Ibuprofen (also called Advil® or Motrin®) helps decrease the bleeding and cramping. You can buy Ibuprofen at any drug store without a prescription. You can take as many as 4 pills (800 mg) of Ibuprofen every 8 hours with food (each pill contains 200 mg). To prevent cramping, start taking Ibuprofen as soon as your period starts and keep taking it every 8 hours for the first 2-3 days of your period. You can also put a hot water bottle on your belly if you have bad cramps.     Your IUD may come out by itself in the first three months. If you can feel the strings, the IUD is in the right place. If your IUD comes out, you can become pregnant immediately. If you are not sure how to check the strings, we can  help you (Our phone number is at the top of this paper.) Meanwhile, use condoms.     The IUD does NOT protect you against sexually transmitted infections. ALWAYS use protection against sexually transmitted infections (male condoms, female condoms, dental dams) if you are at risk. If you think you have been exposed to an STI, discuss testing with your healthcare provider. Most infections can be treated WITHOUT removing your IUD.     WARNING SIGNS:  Within the first 3 weeks of the IUD insertion:  - Fever (>101F)  - Chills  - Strong or sharp pain in your stomach or belly At Any Time (these are very rare):  - Late period (for Paragard® users)  - Feeling pregnant (breast tenderness, nausea, vomiting)  - Positive home pregnancy test  If you develop any of the above warning signs, please call us at [phone]    [employer] [facility]  www.[email or web address]

## 2024-04-18 NOTE — PROCEDURES
IUD Removal     Pregnancy Results: negative from urine test   Mirena IUD malpositioned  Consent signed.  Procedure discussed with the patient in detail including indication, risks, benefits, alternatives and complications.    Procedure:  Speculum placed in the vagina.  Betadine wash of vagina and cervix.  An Endocervical speculum was used to visualize strings.  An Allis clamp used to grasp IUD strings.  An IUD hook was used to obtain the IUD.  Mirena IUD was removed without difficulty.  The patient tolerated the procedure well.      Visit Plan:  Discharge instructions were reviewed with the patient.    JEAN CARLOS Stewart      IUD Insertion     Pregnancy Results: negative from urine test       Consent signed.  Procedure discussed with the patient in detail including indication, risks, benefits, alternatives and complications.    Procedure:  Speculum placed in the vagina.  Betadine wash of vagina and cervix.  Single tooth tenaculum was placed at the 12 o'clock position.  Cervical stenosis encountered. Mini dilators used to dilate cervix.  Uterus sounded to 8 cm.  Mirena IUD was placed without difficulty.  Strings cut at 3 cm.  Single tooth tenaculum removed.  Silver nitrate used to achieve hemostasis.  Good hemostasis noted.    Patient tolerated procedure well.      Visit Plan:  IUD surveillance was discussed with the patient.    JEAN CARLOS Stewart

## 2024-05-09 ENCOUNTER — TELEPHONE (OUTPATIENT)
Dept: FAMILY MEDICINE CLINIC | Facility: CLINIC | Age: 34
End: 2024-05-09

## 2024-05-09 DIAGNOSIS — E05.90 HYPERTHYROIDISM: ICD-10-CM

## 2024-05-10 RX ORDER — METHIMAZOLE 10 MG/1
10 TABLET ORAL DAILY
Qty: 90 TABLET | Refills: 0 | Status: SHIPPED | OUTPATIENT
Start: 2024-05-10 | End: 2025-05-05

## 2024-05-10 NOTE — TELEPHONE ENCOUNTER
Please review. Protocol Failed; No Protocol  Future Appointments   Date Time Provider Department Center   6/18/2024  3:40 PM Lucie Mcrae APRN [facility]   8/30/2024  2:45 PM ADO US RM1 ADO  EM Kj Navarrete message sent to patient to schedule an office visit with Provider and/or  Routed to Patient  for assistance with appointment.     Requested Prescriptions   Pending Prescriptions Disp Refills    METHIMAZOLE 10 MG Oral Tab [Pharmacy Med Name: Methimazole 10 Mg Tab Avet] 90 tablet 0     Sig: Take 1 tablet (10 mg total) by mouth daily.       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, unspecified thyrotoxicosis type    [facility] [place]    Office Visit    1 year ago [facility] woman [facility]    Office Visit    2 years ago Suspected COVID-19 virus infection    [facility]    Office Visit          Future Appointments         Provider Department Appt Notes    In 1 month Lucie Mcrae APRN [facility] - OB/GYN Annual+ Pap+iud check    In 3 months [facility] - [place] Check my thyroid                    Passed - TSH resulted in past 12 months        Passed - Last TSH within normal  limits     Lab Results   Component Value Date    TSH 2.300 09/11/2023                        Future Appointments         Provider Department Appt Notes    In 1 month Lucie Mcrae APRN [facility] - OB/GYN Annual+ Pap+iud check    In 3 months [facility] US [facility] - [place] Check my thyroid          Recent Outpatient Visits              3 weeks ago Encounter for IUD removal and reinsertion    [facility] - OB/Lucie Alves APRN    Office Visit    2 months ago Abnormal uterine bleeding (AUB)    [facility] - OB[facility]GY[facility] Lucie Mcrae APRN    Office Visit    8 months ago 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, [place] Yesika Armijo PA-C    Office Visit

## 2024-05-28 ENCOUNTER — OFFICE VISIT (OUTPATIENT)
Dept: FAMILY MEDICINE CLINIC | Facility: CLINIC | Age: 34
End: 2024-05-28

## 2024-05-28 VITALS
BODY MASS INDEX: 25.27 KG/M2 | WEIGHT: 161 LBS | HEIGHT: 67 IN | HEART RATE: 82 BPM | DIASTOLIC BLOOD PRESSURE: 84 MMHG | SYSTOLIC BLOOD PRESSURE: 127 MMHG

## 2024-05-28 DIAGNOSIS — Z97.5 IUD (INTRAUTERINE DEVICE) IN PLACE: ICD-10-CM

## 2024-05-28 DIAGNOSIS — E04.2 MULTINODULAR GOITER: ICD-10-CM

## 2024-05-28 DIAGNOSIS — Z00.00 WELL ADULT EXAM: ICD-10-CM

## 2024-05-28 DIAGNOSIS — K59.01 SLOW TRANSIT CONSTIPATION: ICD-10-CM

## 2024-05-28 DIAGNOSIS — E05.90 HYPERTHYROIDISM: Primary | ICD-10-CM

## 2024-05-28 PROCEDURE — 3008F BODY MASS INDEX DOCD: CPT | Performed by: NURSE PRACTITIONER

## 2024-05-28 PROCEDURE — 99395 PREV VISIT EST AGE 18-39: CPT | Performed by: NURSE PRACTITIONER

## 2024-05-28 PROCEDURE — 3074F SYST BP LT 130 MM HG: CPT | Performed by: NURSE PRACTITIONER

## 2024-05-28 PROCEDURE — 3079F DIAST BP 80-89 MM HG: CPT | Performed by: NURSE PRACTITIONER

## 2024-05-28 PROCEDURE — 99213 OFFICE O/P EST LOW 20 MIN: CPT | Performed by: NURSE PRACTITIONER

## 2024-05-28 NOTE — ASSESSMENT & PLAN NOTE
Please aim to eat a diet high in fresh fruits and vegetables, lean protein sources, complex carbohydrates and limited processed and fast foods.  Try to get at least 150 minutes of exercise per week-a combination of weight resistance and cardio is preferred.    Screening labs  Has follow up w women[facility] health for pap next month and iud follow up  Will send in tdap records

## 2024-05-28 NOTE — ASSESSMENT & PLAN NOTE
Check thyroid labs as recommended by endo  Follow up w endo  Continue MMA until lab results rec'd

## 2024-05-28 NOTE — PROGRESS NOTES
HPI  Pt presents for well adult physical . Has well woman visit scheduled next month w ob gyne    Had tdap a few years back and will send in records.     Pt has h/o hyperthyroid and nodular goiter.  Was seen for thyrotoxicosis 9.5 weeks post partum. Was seen by daron 8/31/23. Did not follow up for lab results in January.     Is still taking MMA 10 mg daily.     Is having trouble losing \"baby weight. Is eating very healthy, walks a lot    Baby is 1 1/2 years old    Works full time    Sleeping is disrupted-takes melatonin at night-will wake up sleepy    Pt states she is very frustrated with all of the issues she is having w her thyroid and all of the weight gain.     Having stomach issues-having some stomach cramping. Is feeling constipated.  Goes every 3-4 days. Took miralax and it helped  had hemorrhoids after pregnancy-has been using prep h supp and wipes.     Iud in place-recently replaced one month ago.   Review of Systems   Constitutional:  Positive for fatigue and unexpected weight change. Negative for activity change, appetite change and fever.   HENT:  Negative for congestion, ear pain, rhinorrhea and sneezing.    Eyes:  Negative for pain, redness and visual disturbance.   Respiratory:  Negative for cough, chest tightness, shortness of breath and wheezing.    Cardiovascular:  Negative for chest pain and palpitations.   Gastrointestinal:  Positive for abdominal distention, anal bleeding, constipation and rectal pain. Negative for abdominal pain, diarrhea, nausea and vomiting.   Genitourinary:  Negative for dysuria and menstrual problem.   Musculoskeletal:  Negative for back pain, gait problem and myalgias.   Skin:  Negative for color change and rash.   Neurological:  Negative for dizziness, weakness and headaches.   Psychiatric/Behavioral:  Positive for dysphoric mood and sleep disturbance. Negative for decreased concentration, self-injury and suicidal ideas. The patient is not nervous/anxious.        Vitals:     05/28/24 1718   BP: 127/84   Pulse: 82   Weight: 161 lb (73 kg)   Height: 5' 7\" (1.702 m)     Body mass index is 25.22 kg/m².  Wt Readings from Last 6 Encounters:   05/28/24 161 lb (73 kg)   04/18/24 161 lb 6.4 oz (73.2 kg)   02/20/24 167 lb (75.8 kg)   08/31/23 169 lb 9.6 oz (76.9 kg)   05/04/23 166 lb (75.3 kg)   09/07/21 135 lb (61.2 kg)        Health Maintenance   Topic Date Due    DTaP,Tdap,and Td Vaccines (1 - Tdap) Never done    Pap Smear  Never done    COVID-19 Vaccine (1 - 2023-24 season) Never done    Annual Depression Screening  Never done    Annual Physical  05/04/2024    Influenza Vaccine (Season Ended) 10/01/2024    Pneumococcal Vaccine: Birth to 64yrs  Aged Out       No LMP recorded (lmp unknown). (Menstrual status: IUD - Intrauterine Device).    Past Medical History:    Hyperthyroidism       .History reviewed. No pertinent surgical history.    Family History   Problem Relation Age of Onset    Cancer Mother 50        sarcoma of ovaries?    Hypertension Mother     Hypertension Father        Social History     Socioeconomic History    Marital status:      Spouse name: Not on file    Number of children: Not on file    Years of education: Not on file    Highest education level: Not on file   Occupational History    Not on file   Tobacco Use    Smoking status: Never    Smokeless tobacco: Never   Substance and Sexual Activity    Alcohol use: Not Currently     Comment: rare    Drug use: Never    Sexual activity: Not on file   Other Topics Concern    Not on file   Social History Narrative    Not on file     Social Determinants of Health     Financial Resource Strain: Not on file   Food Insecurity: Not on file   Transportation Needs: Not on file   Physical Activity: Not on file   Stress: Not on file   Social Connections: Not on file   Housing Stability: Not on file       Current Outpatient Medications   Medication Sig Dispense Refill    methIMAzole 10 MG Oral Tab Take 1 tablet (10 mg total) by mouth  daily. Pt overdue for physical  and labs-no additional refills until seen 90 tablet 0       Allergies:  No Known Allergies    Physical Exam  Vitals and nursing note reviewed.   Constitutional:       General: She is not in acute distress.     Appearance: Normal appearance. She is well-developed.   HENT:      Head: Normocephalic and atraumatic.      Right Ear: Tympanic membrane, ear canal and external ear normal.      Left Ear: Tympanic membrane, ear canal and external ear normal.      Nose: Nose normal. No congestion or rhinorrhea.      Mouth/Throat:      Mouth: Mucous membranes are moist.      Pharynx: Oropharynx is clear. No oropharyngeal exudate or posterior oropharyngeal erythema.   Eyes:      General:         Right eye: No discharge.         Left eye: No discharge.      Conjunctiva/sclera: Conjunctivae normal.      Pupils: Pupils are equal, round, and reactive to light.   Neck:      Thyroid: Thyromegaly present. No thyroid tenderness.   Cardiovascular:      Rate and Rhythm: Normal rate and regular rhythm.      Heart sounds: Normal heart sounds. No murmur heard.  Pulmonary:      Effort: Pulmonary effort is normal. No respiratory distress.      Breath sounds: Normal breath sounds. No wheezing or rales.   Chest:      Chest wall: No tenderness.   Abdominal:      General: Bowel sounds are normal. There is no distension.      Palpations: Abdomen is soft.      Tenderness: There is no abdominal tenderness.   Musculoskeletal:         General: No tenderness. Normal range of motion.      Cervical back: Normal range of motion and neck supple.   Lymphadenopathy:      Cervical: No cervical adenopathy.   Skin:     General: Skin is warm and dry.      Findings: No rash.   Neurological:      Mental Status: She is alert and oriented to person, place, and time.      Coordination: Coordination normal.   Psychiatric:         Behavior: Behavior normal.         Thought Content: Thought content normal.         Judgment: Judgment normal.          Assessment and Plan:  Problem List Items Addressed This Visit       Hyperthyroidism - Primary     Check thyroid labs as recommended by endo  Follow up w endo  Continue MMA until lab results rec'd         Relevant Orders    TSH and Free T4 [E]    Free T3 (Triiodothryronine)    IUD (intrauterine device) in place     Iud replaced last month due to migration and sub optimal placement           Multinodular goiter     Discussed treatment options for goiter  Pt will follow up w endo to further discuss options after labs completed         Relevant Orders    TSH and Free T4 [E]    Free T3 (Triiodothryronine)    Slow transit constipation     May be thyroid related  Increase fluid and fiber  May use miralax as directed  Improving constipation will improve hemorrhoids         Well adult exam     Please aim to eat a diet high in fresh fruits and vegetables, lean protein sources, complex carbohydrates and limited processed and fast foods.  Try to get at least 150 minutes of exercise per week-a combination of weight resistance and cardio is preferred.    Screening labs  Has follow up w women's health for pap next month and iud follow up  Will send in tdap records           Relevant Orders    CBC, Platelet; No Differential    Comp Metabolic Panel (14)    Hemoglobin A1C    Lipid Panel    Vitamin B12    Vitamin D                   Discussed plan of care with pt and pt is in agreement.All questions answered. Pt to call with questions or concerns.    Encouraged to sign up for My Chart if not already registered.

## 2024-05-28 NOTE — ASSESSMENT & PLAN NOTE
Discussed treatment options for goiter  Pt will follow up w daron to further discuss options after labs completed

## 2024-05-28 NOTE — ASSESSMENT & PLAN NOTE
May be thyroid related  Increase fluid and fiber  May use miralax as directed  Improving constipation will improve hemorrhoids

## 2024-07-24 ENCOUNTER — OFFICE VISIT (OUTPATIENT)
Dept: OBGYN CLINIC | Facility: CLINIC | Age: 34
End: 2024-07-24

## 2024-07-24 VITALS
DIASTOLIC BLOOD PRESSURE: 78 MMHG | BODY MASS INDEX: 25.14 KG/M2 | HEART RATE: 98 BPM | HEIGHT: 67 IN | SYSTOLIC BLOOD PRESSURE: 110 MMHG | WEIGHT: 160.19 LBS

## 2024-07-24 DIAGNOSIS — Z86.19 HISTORY OF HPV INFECTION: ICD-10-CM

## 2024-07-24 DIAGNOSIS — Z97.5 PRESENCE OF IUD: ICD-10-CM

## 2024-07-24 DIAGNOSIS — Z01.419 WELL WOMAN EXAM WITH ROUTINE GYNECOLOGICAL EXAM: Primary | ICD-10-CM

## 2024-07-24 DIAGNOSIS — Z12.4 SCREENING FOR CERVICAL CANCER: ICD-10-CM

## 2024-07-24 PROCEDURE — 3074F SYST BP LT 130 MM HG: CPT | Performed by: NURSE PRACTITIONER

## 2024-07-24 PROCEDURE — 3008F BODY MASS INDEX DOCD: CPT | Performed by: NURSE PRACTITIONER

## 2024-07-24 PROCEDURE — 3078F DIAST BP <80 MM HG: CPT | Performed by: NURSE PRACTITIONER

## 2024-07-24 PROCEDURE — 99395 PREV VISIT EST AGE 18-39: CPT | Performed by: NURSE PRACTITIONER

## 2024-07-24 NOTE — PROGRESS NOTES
[facility]    Obst[facility]s [facility]ne[facility]    Chief Complaint   Patient presents with    Gyn Exam     ANNUAL EXAM       Veronica Rosales is a 33 year old female  Patient's last menstrual period was 2024. presenting for annual gynecology exam.  Had new mirena placed in April - no period in July. Happy with IUD. No concerns. No pelvic pain, no abnormal discharge, no urinary or bowel concerns. Sexually active, same partner.    Hx of hyperthyroid - seeing endocrine in August. On methimazole.e  Needs pap today    Pap:2023 NILM,  human papillomavirus +; colpo benign   NILM, negative HPV     Contraception:mirena IUD placed in 2024  Mammo: n/a  Colonoscopy: n/a    OBSTETRICS HISTORY:  OB History    Para Term  AB Living   1 1 1 0 0 1   SAB IAB Ectopic Multiple Live Births   0 0 0 0 1       GYNE HISTORY:  Period Cycle (Days): MONTHLY (2024  3:02 PM)  Period Duration (Days): 5-6 (2024  3:02 PM)  Period Flow: LIGHT (2024  3:02 PM)  Use of Birth Control (if yes, specify type): Mirena IUD (2024  3:02 PM)      History   Sexual Activity    Sexual activity: Yes    Birth control/ protection: Mirena         MEDICAL HISTORY:  Past Medical History:    Hyperthyroidism     History reviewed. No pertinent surgical history.    SOCIAL HISTORY:  Social History     Socioeconomic History    Marital status:      Spouse name: Not on file    Number of children: Not on file    Years of education: Not on file    Highest education level: Not on file   Occupational History    Not on file   Tobacco Use    Smoking status: Never    Smokeless tobacco: Never   Substance and Sexual Activity    Alcohol use: Not Currently     Comment: rare    Drug use: Never    Sexual activity: Yes     Birth control/protection: Mirena   Other Topics Concern    Not on file   Social History Narrative    Not on file     Social Determinants of Health     Financial Resource Strain: Not on file   Food Insecurity: Not on  file   Transportation Needs: Not on file   Physical Activity: Not on file   Stress: Not on file   Social Connections: Not on file   Housing Stability: Not on file         Depression Screening (PHQ-2/PHQ-9): Over the LAST 2 WEEKS   Little interest or pleasure in doing things (over the last two weeks)?: Not at all    Feeling down, depressed, or hopeless (over the last two weeks)?: Not at all    PHQ-2 SCORE: 0           FAMILY HISTORY:  Family History   Problem Relation Age of Onset    Cancer Mother 50        sarcoma of ovaries?    Hypertension Mother     Hypertension Father        MEDICATIONS:    Current Outpatient Medications:     methIMAzole 10 MG Oral Tab, Take 1 tablet (10 mg total) by mouth daily. Pt overdue for physical  and labs-no additional refills until seen, Disp: 90 tablet, Rfl: 0    ALLERGIES:  No Known Allergies      Review of Systems:  Constitutional:  Denies fatigue, night sweats, hot flashes  Eyes:  denies blurred or double vision  Cardiovascular:  denies chest pain or palpitations  Respiratory:  denies shortness of breath  Gastrointestinal:  denies heartburn, abdominal pain, diarrhea or constipation  Genitourinary:  denies dysuria, incontinence, abnormal vaginal discharge, vaginal itching,   Musculoskeletal:  denies back pain   Skin/Breast:  Denies any breast pain, lumps, or discharge.   Neurological:  denies headaches, extremity weakness or numbness.  Psychiatric: denies depression or anxiety.  Endocrine:   denies excessive thirst or urination.  Heme/Lymph:  denies history of anemia, easy bruising or bleeding.      PHYSICAL EXAM:     Vitals:    07/24/24 1506   BP: 110/78   Pulse: 98   Weight: 160 lb 3.2 oz (72.7 kg)   Height: 5' 7\" (1.702 m)       Body mass index is 25.09 kg/m².     Patient offered chaperone, patient declined    Constitutional: well developed, well nourished  Psychiatric:  Oriented to time, place, person and situation. Appropriate mood and affect  Head/Face:  normocephalic  Neck/Thyroid: thyroid symmetric, no thyromegaly, no nodules, no adenopathy  Lymphatic:no abnormal supraclavicular or axillary adenopathy is noted  Breast: normal without palpable masses, tenderness, asymmetry, nipple discharge, nipple retraction or skin changes  Abdomen:  soft, nontender, nondistended, no masses  Skin/Hair: no unusual rashes or bruises  Extremities: no edema, no cyanosis    Pelvic Exam:  External Genitalia: normal appearance, hair distribution, and no lesions  Urethral Meatus:  normal in size, location, without lesions and prolapse  Bladder:  No fullness, masses or tenderness  Vagina:  Normal appearance without lesions, no abnormal discharge  Cervix:  +IUD strings, Normal without tenderness on motion  Uterus: normal in size, contour, position, mobility, without tenderness  Adnexa: normal without masses or tenderness  Perineum: normal  Anus: no hemorroids     Assessment & Plan:    ICD-10-CM    1. Well woman exam with routine gynecological exam  Z01.419       2. Screening for cervical cancer  Z12.4 ThinPrep PAP Smear     Hpv Dna  High Risk , Thin Prep Collect     Hpv Dna  High Risk , Thin Prep Collect     ThinPrep PAP Smear     THINPREP PAP SMEAR ONLY      3. History of HPV infection  Z86.19 ThinPrep PAP Smear     Hpv Dna  High Risk , Thin Prep Collect     Hpv Dna  High Risk , Thin Prep Collect     ThinPrep PAP Smear     THINPREP PAP SMEAR ONLY      4. Presence of IUD  Z97.5          Reviewed ASCCP guidelines with the patient   Pap with human papillomavirus today, last pap human papillomavirus +  Contraception: Using mirena IUD , appears in situ  Breast Health:     Reviewed current guidelines with the patient and to start Mammograms at age 40  Reviewed monthly self breast exams with the patient   Discussed diet, exercise, MVIs with Ca/Vit D  Follow up in 1 yr for WWE    JEAN CARLOS Stewart    This note was prepared using Dragon [employer] voice recognition dictation software. As a result  errors may occur. When identified these errors have been corrected. While every attempt is made to correct errors during dictation discrepancies may still exist.

## 2024-07-25 LAB — HPV E6+E7 MRNA CVX QL NAA+PROBE: NEGATIVE

## 2024-08-11 DIAGNOSIS — E05.90 HYPERTHYROIDISM: ICD-10-CM

## 2024-08-14 RX ORDER — METHIMAZOLE 10 MG/1
10 TABLET ORAL DAILY
Qty: 90 TABLET | Refills: 3 | Status: SHIPPED | OUTPATIENT
Start: 2024-08-14 | End: 2025-08-09

## 2024-08-14 NOTE — TELEPHONE ENCOUNTER
Refill passed per [facility] protocol.  Requested Prescriptions   Pending Prescriptions Disp Refills    METHIMAZOLE 10 MG Oral Tab [Pharmacy Med Name: Methimazole 10 Mg Tab Avet] 90 tablet 0     Sig: Take 1 tablet (10 mg total) by mouth daily. Pt overdue for physical  and labs-no additional refills until seen       Hyperthyroid Medication Protocol Passed - 8/11/2024  9:25 AM        Passed - In person appointment or virtual visit in the past 12 mos or appointment in next 3 mos     Recent Outpatient Visits              3 weeks ago Well woman exam with routine gynecological exam    [facility]/Lucie Alves APRN    Office Visit    [facility]    Office Visit    3 months ago Encounter for IUD removal and reinsertion    [facility]/Lucie Alves APRN    Office Visit    5 months ago Abnormal uterine bleeding (AUB)    [facility]/Lucie Alves APRN    Office Visit    11 months ago Thyrotoxicosis without thyroid storm, unspecified thyrotoxicosis type    [facility], Hallie Rogers Sudha K, MD    Office Visit          Future Appointments         Provider Department Appt Notes    In 2 weeks ADO US 1 [facility] - [place] Check my thyroid                    Passed - TSH resulted in past 12 months        Passed - Last TSH within normal limits     Lab Results   Component Value Date    TSH 2.300 09/11/2023                    Recent Outpatient Visits              3 weeks ago Well woman exam with routine gynecological exam    [facility]/Lucie Alves APRN    Office Visit    2 months ago Hyperthyroidism    [facility] [place]  Paulina Kennedy, APRN    Office Visit    3 months ago Encounter for IUD removal and reinsertion    [facility] [place] - OB[facility]GY[facility] Lucie Mcrae, APRN    Office Visit    5 months ago Abnormal uterine bleeding (AUB)    [facility] - [facility] Lucie Mcrae, APRN    Office Visit    11 months ago Thyrotoxicosis without thyroid storm, unspecified thyrotoxicosis type    [facility]ie [facility] Sudha K, MD    Office Visit          Future Appointments         Provider Department Appt Notes    In 2 weeks ADO US RM1 [facility] - [place] Check my thyroid

## 2024-08-30 ENCOUNTER — HOSPITAL ENCOUNTER (OUTPATIENT)
Dept: ULTRASOUND IMAGING | Age: 34
Discharge: HOME | End: 2024-08-30
Attending: INTERNAL MEDICINE
Payer: COMMERCIAL

## 2024-08-30 DIAGNOSIS — E04.2 MULTINODULAR GOITER: ICD-10-CM

## 2024-08-30 PROCEDURE — 76536 US EXAM OF HEAD AND NECK: CPT | Performed by: INTERNAL MEDICINE

## 2025-05-07 NOTE — PROGRESS NOTES
FAMILY MEDICINE CLINIC NOTE    HPI  Veronica Rosales is a 34 year old female presenting for physical    #Health Maintenance  -Diet: A lot of fried foods.   -Exercise: None - she states she is going to start   -Lung cancer screen: Not indicated  -Colon cancer screen: Not indicated  -Statin:  Will check lipid panel  -ASA: Not indicated  -Breast cancer screen: Not indicated  -Breast cancer medication to reduce risk: Declines   -Periods: LMP 4/29. Periods are irregular due to IUD.  -Cervical cancer screen: - pap smear 7/202/24 Follows with gynecology  -DEXA: Not indicated  -BRCA: Not indicated  -Intimate partner violence: Denies abuse  -HIV screen: - 11/2022 negative  -Hep C screen: - 4/2022 negative  -Gonorrhea/chlamydia:  Not Indicated  -Syphillis: Not indicated  -TB: Not indicated  -Tobacco/alcohol: Per below  -Depression: PHQ-2 score of 0 (score >/= 3 do PHQ-9)  -Advanced Directive: Indicated    #Immunizations  -Tdap:  believes completed 2022  -Flu shot: Not indicated - not season  -PCV13: Not indicated   -PCV20: Not indicated   -PPSV23: Not indicated   -HPV: Not indicated  -RZV (preferred) or VZL: Not indicated   -RSV: Not indicated   -COVID: Indicated    #HLD  -lifestyle modifications    #Hyperthyroidism  #Thyroid nodules  -was previously on methimiazole - reports not taking any longer; off for 3-4 months   -previously seeing Dr Schaffer - does not want to see  -US thyroid 8/2024    #HPV  -6/2023 normal pap, HPV positive  -colposcopy 7/2023  -7/2024 normal pap, HPV negative  -OBGYTRINY Reyna  -going to transition back to OBSTEPHANIE Loza    #Patient Care Team  Patient Care Team:  Janey Ramirez MD as PCP - General (Family Medicine)  Lucie Mcrae APRN (Nurse Practitioner)    LUIS ALBERTO  GENERAL: No fever/chills, no recent weight loss   HEENT: No visual changes, no changes in hearing, no sore throats  NECK: No pain, no swelling  RESP: No cough, no SOB  CV: No chest pain, no palpitations  GI: No abd pain, no  N/V/D  MSK: No edema, no pain  SKIN: No new rashes  NEURO: No numbness, no tingling, no HA    HEALTH MAINTENANCE CHECKLIST  Health Maintenance Topics with due status: Overdue       Topic Date Due    DTaP,Tdap,and Td Vaccines Never done    COVID-19 Vaccine Never done    Annual Depression Screening 2025     Health Maintenance Topics with due status: Due Soon       Topic Date Due    Annual Physical 2025       ALLERGIES  Allergies[1]    MEDICATIONS  Current Medications[2]    ACTIVE PROBLEM  Problem List[3]    PAST MEDICAL HISTORY  Past Medical History[4]    PAST SOCIAL HISTORY  Social History     Socioeconomic History    Marital status:      Spouse name: Not on file    Number of children: Not on file    Years of education: Not on file    Highest education level: Not on file   Occupational History    Not on file   Tobacco Use    Smoking status: Former     Current packs/day: 0.00     Average packs/day: 0.5 packs/day for 6.0 years (3.0 ttl pk-yrs)     Types: Cigarettes     Start date:      Quit date: 2016     Years since quittin.3    Smokeless tobacco: Never   Vaping Use    Vaping status: Some Days    Substances: Nicotine, Flavoring   Substance and Sexual Activity    Alcohol use: Yes     Comment: Weekends - 1 bottle    Drug use: Yes     Types: Cannabis    Sexual activity: Yes     Partners: Male     Birth control/protection: Mirena   Other Topics Concern    Not on file   Social History Narrative    Relationships:  - Leandro    Children: Darinel (2M)    Pets: 2 Dogs    School: N/A    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      Origin: From [facility], came to  .     Interests: Enjoys watching TV, drinking wine, cars M6    Spiritual:  Voodoo Rastafari     Social Drivers of Health     Food Insecurity: Not on file   Transportation Needs: Not on file   Stress: Not on file   Housing Stability: Not on file       PAST SURGICAL HISTORY  Past Surgical History[5]    PAST FAMILY  HISTORY  Family History[6]    PHYSICAL EXAM  Vitals:    05/08/25 1547   BP: 108/60   Pulse: 68   Temp: 97 °F (36.1 °C)   SpO2: 99%   Weight: 129 lb (58.5 kg)   Height: 5' 7\" (1.702 m)      Body mass index is 20.2 kg/m².    GENERAL: NAD  HEENT: Moist mucous membranes, no tonsillar swelling or erythema, PERRLA bilat, TM translucent and non-bulging  NECK: Supple, non-tender  RESP: CTAB, no wheezing, no rales, no rhonchi  CV: RRR, no murmurs  GI: Soft, non-distended, non-tender, no guarding, no rebound, no masses  MSK: No edema  SKIN: Warm and dry, no rashes  NEURO: Answering questions appropriately    LABS  Lab Results   Component Value Date    WBC 6.9 09/11/2023    HGB 13.8 09/11/2023    HCT 43.3 09/11/2023    .0 09/11/2023    NEPERCENT 52.2 09/11/2023    LYPERCENT 38.0 09/11/2023    MOPERCENT 8.4 09/11/2023    EOPERCENT 0.9 09/11/2023    BAPERCENT 0.4 09/11/2023    NE 3.62 09/11/2023    LYMABS 2.64 09/11/2023    MOABSO 0.58 09/11/2023    EOABSO 0.06 09/11/2023    BAABSO 0.03 09/11/2023       Lab Results   Component Value Date     09/11/2023    K 4.1 09/11/2023     09/11/2023    CO2 29.0 09/11/2023    ANIONGAP 3 09/11/2023    BUN 14 09/11/2023    CREATSERUM 0.92 09/11/2023    BUNCREA 22.7 (H) 05/17/2023     (H) 09/11/2023    CA 9.1 09/11/2023    OSMOCALC 289 09/11/2023    ALT 29 09/11/2023    AST 16 09/11/2023    ALKPHO 89 09/11/2023    BILT 0.6 09/11/2023    TP 7.9 09/11/2023    ALB 4.1 09/11/2023    GLOBULIN 3.8 09/11/2023    ELECTAG 1.1 09/11/2023    FASTING No 09/11/2023         Lab Results   Component Value Date    CHOLEST 207 (H) 05/17/2023    TRIG 111 05/17/2023    HDL 79 (H) 05/17/2023     (H) 05/17/2023    VLDL 19 05/17/2023    NONHDLC 128 05/17/2023        DIAGNOSTICS    ASSESSMENT/PLAN  Problem List Items Addressed This Visit          Cardiac and Vasculature    Hyperlipidemia    Healthy diet and exercise advised  Can monitor lipid panel with blood work         Relevant  Orders    Comp Metabolic Panel (14)    Lipid Panel       Endocrine and Metabolic    Hyperthyroidism    Patient with hyperthyroidism.  She was on methimazole which she now has self discontinued.  Advised close follow-up with endocrinology to discuss management options  Check TSH, free T3, free T4.         Relevant Orders    ENDOCRINOLOGY - INTERNAL    Comp Metabolic Panel (14)    Lipid Panel    TSH and Free T4 [E]    Triiodothyronine,Free,Serum [E]    Hemoglobin A1C    Multinodular goiter    Advised follow up with endocrinology             Genitourinary and Reproductive    History of HPV infection    Patient with a history of HPV infection  Most recent Pap smear was normal  Advise close follow-up with OB/GYN.         Relevant Medications    Levonorgestrel (MIRENA, 52 MG,) 20 MCG/DAY Intrauterine IUD    IUD (intrauterine device) in place    Mirena in place         Relevant Medications    Levonorgestrel (MIRENA, 52 MG,) 20 MCG/DAY Intrauterine IUD       Health Encounters    Health maintenance examination - Primary    Exercise and diet advised.  CBC, CMP, lipid panel, Hba1c, TSH  Tdap - can send me dates if able  COVID vaccine advised.  Advanced directive information provided.         Relevant Orders    Hemoglobin A1C       Return in about 1 year (around 5/8/2026) for physical.    Davon Aviles MD  Family Medicine         [1] No Known Allergies  [2]   Current Outpatient Medications   Medication Sig Dispense Refill    Levonorgestrel (MIRENA, 52 MG,) 20 MCG/DAY Intrauterine IUD 20 mcg (1 each total) by Intrauterine route one time.      methIMAzole 10 MG Oral Tab Take 1 tablet (10 mg total) by mouth daily. (Patient not taking: Reported on 5/8/2025) 90 tablet 3   [3]   Patient Active Problem List  Diagnosis    Hyperthyroidism    Multinodular goiter    IUD (intrauterine device) in place    History of HPV infection    Hyperlipidemia    Health maintenance examination   [4]   Past Medical History:   Hyperthyroidism   [5]    Past Surgical History:  Procedure Laterality Date    Cosmetic surgery Bilateral     Ear   [6]   Family History  Problem Relation Age of Onset    Cancer Mother 50        sarcoma of ovaries    Hypertension Mother     Hypertension Father     DVT/VTE Maternal Grandmother     Other (Anorexia) Maternal Grandfather     No Known Problems Paternal Grandmother     Heart Disease Paternal Grandfather     No Known Problems Half-Sister     No Known Problems Half-Sister     Colon Cancer Neg     Breast Cancer Neg

## 2025-05-08 ENCOUNTER — OFFICE VISIT (OUTPATIENT)
Dept: INTERNAL MEDICINE CLINIC | Facility: CLINIC | Age: 35
End: 2025-05-08
Payer: COMMERCIAL

## 2025-05-08 VITALS
HEART RATE: 68 BPM | OXYGEN SATURATION: 99 % | HEIGHT: 67 IN | WEIGHT: 129 LBS | TEMPERATURE: 97 F | SYSTOLIC BLOOD PRESSURE: 108 MMHG | BODY MASS INDEX: 20.25 KG/M2 | DIASTOLIC BLOOD PRESSURE: 60 MMHG

## 2025-05-08 DIAGNOSIS — E05.90 HYPERTHYROIDISM: ICD-10-CM

## 2025-05-08 DIAGNOSIS — Z86.19 HISTORY OF HPV INFECTION: ICD-10-CM

## 2025-05-08 DIAGNOSIS — E04.2 MULTINODULAR GOITER: ICD-10-CM

## 2025-05-08 DIAGNOSIS — E78.5 HYPERLIPIDEMIA, UNSPECIFIED HYPERLIPIDEMIA TYPE: ICD-10-CM

## 2025-05-08 DIAGNOSIS — Z97.5 IUD (INTRAUTERINE DEVICE) IN PLACE: ICD-10-CM

## 2025-05-08 DIAGNOSIS — Z00.00 HEALTH MAINTENANCE EXAMINATION: Primary | ICD-10-CM

## 2025-05-08 PROBLEM — K59.01 SLOW TRANSIT CONSTIPATION: Status: RESOLVED | Noted: 2023-05-04 | Resolved: 2025-05-08

## 2025-05-08 PROBLEM — Z01.419 WELL WOMAN EXAM: Status: RESOLVED | Noted: 2023-05-04 | Resolved: 2025-05-08

## 2025-05-08 PROCEDURE — 99385 PREV VISIT NEW AGE 18-39: CPT | Performed by: FAMILY MEDICINE

## 2025-05-08 RX ORDER — LEVONORGESTREL 52 MG/1
1 INTRAUTERINE DEVICE INTRAUTERINE ONCE
COMMUNITY

## 2025-05-08 NOTE — ASSESSMENT & PLAN NOTE
Patient with a history of HPV infection  Most recent Pap smear was normal  Advise close follow-up with OB/GYN.

## 2025-05-08 NOTE — ASSESSMENT & PLAN NOTE
Exercise and diet advised.  CBC, CMP, lipid panel, Hba1c, TSH  Tdap - can send me dates if able  COVID vaccine advised.  Advanced directive information provided.

## 2025-05-08 NOTE — ASSESSMENT & PLAN NOTE
Patient with hyperthyroidism.  She was on methimazole which she now has self discontinued.  Advised close follow-up with endocrinology to discuss management options  Check TSH, free T3, free T4.

## 2025-05-08 NOTE — PATIENT INSTRUCTIONS
PATIENT INSTRUCTIONS    Thank you for seeing me today, it was a pleasure taking care of you.  Please check out at the  and schedule a follow up appointment.  Return in about 1 year (around 5/8/2026) for physical.  Please remember that the preferred arti period for appointments is 5 minutes. This is to help maximize the amount of time that we can spend together at our visits.    Please get your labs drawn at your preferred lab.  The following imaging studies were ordered: None  Please also follow up with the following specialists: OBGYN, endocrinology  Please fill out the advance directive information (power of  documents) and bring a copy to our clinic.  Try to focus on a more healthy diet   Try to get in some exercise  If possible, can send me tdap vaccine date      Best,   Dr. Stefan ANTON LABS AND IMAGING INFORMATION    Here are some lab and imaging locations available to you. Please note that some of the times and availabilities are subject to change. Please refer to the [employer] webpage for the most recent updates. There are also additional locations that can be found on the [employer] webpage.    To schedule a lab appointment, please call [phone].    To schedule an imaging appointment, please call [phone], option 2      [address] [address]    Lab Hours  Monday - Friday 7:30am - 5pm  Saturday 6:30am - 3pm    X-ray Hours  Call [phone] for hours or to schedule      [facility]  [address] [place] [address]    Lab Hours  Monday - Friday 6:30am - 8pm  Saturday 6:30am - 3pm  Sunday 6:30am - 3pm    X-ray Hours  Call [phone] for hours or to schedule      [facility]  [address] [place] [address]    Lab Hours  Monday - Friday 7:30am - 5pm  Saturday 7:30am - 11:30am    X-ray Hours  None at this  location      [facility]  [address] [place] [address]    Lab Hours  Monday - Friday 8am - 12pm    X-ray Hours  Call [phone] for hours or to schedule      [facility] [place]  [address] [place] [address]    Lab Hours  Monday - Friday 7:30am - 5pm  Saturday 6:30am - 3pm    X-ray Hours  Call [phone] for hours or to schedule      [facility] [place]  [address]  [place] [address]    Lab Hours  Monday - Friday 7:30am - 4pm (closed 12:15pm - 1pm)    X-ray Hours  Call [phone] for hours or to schedule      [facility] [place] [facility]  [address]  [address]  [place] [address]    Lab Hours  Monday - Friday 8am - 4:30pm (closed 12:15pm - 1pm)    X-ray Hours  None at this location

## 2025-05-23 ENCOUNTER — TELEPHONE (OUTPATIENT)
Dept: INTERNAL MEDICINE CLINIC | Facility: CLINIC | Age: 35
End: 2025-05-23

## 2025-05-23 ENCOUNTER — LAB ENCOUNTER (OUTPATIENT)
Dept: LAB | Age: 35
End: 2025-05-23
Attending: FAMILY MEDICINE
Payer: COMMERCIAL

## 2025-05-23 DIAGNOSIS — Z00.00 HEALTH MAINTENANCE EXAMINATION: ICD-10-CM

## 2025-05-23 DIAGNOSIS — E05.90 HYPERTHYROIDISM: ICD-10-CM

## 2025-05-23 DIAGNOSIS — E78.5 HYPERLIPIDEMIA, UNSPECIFIED HYPERLIPIDEMIA TYPE: ICD-10-CM

## 2025-05-23 LAB
ALBUMIN SERPL-MCNC: 4.7 G/DL (ref 3.2–4.8)
ALBUMIN/GLOB SERPL: 1.9 {RATIO} (ref 1–2)
ALP LIVER SERPL-CCNC: 40 U/L (ref 37–98)
ALT SERPL-CCNC: <7 U/L (ref 10–49)
ANION GAP SERPL CALC-SCNC: 5 MMOL/L (ref 0–18)
AST SERPL-CCNC: 12 U/L (ref ?–34)
BILIRUB SERPL-MCNC: 0.9 MG/DL (ref 0.3–1.2)
BUN BLD-MCNC: 11 MG/DL (ref 9–23)
BUN/CREAT SERPL: 13.3 (ref 10–20)
CALCIUM BLD-MCNC: 9.6 MG/DL (ref 8.7–10.4)
CHLORIDE SERPL-SCNC: 108 MMOL/L (ref 98–112)
CHOLEST SERPL-MCNC: 195 MG/DL (ref ?–200)
CO2 SERPL-SCNC: 26 MMOL/L (ref 21–32)
CREAT BLD-MCNC: 0.83 MG/DL (ref 0.55–1.02)
EGFRCR SERPLBLD CKD-EPI 2021: 95 ML/MIN/1.73M2 (ref 60–?)
EST. AVERAGE GLUCOSE BLD GHB EST-MCNC: 91 MG/DL (ref 68–126)
FASTING PATIENT LIPID ANSWER: YES
FASTING STATUS PATIENT QL REPORTED: YES
GLOBULIN PLAS-MCNC: 2.5 G/DL (ref 2–3.5)
GLUCOSE BLD-MCNC: 81 MG/DL (ref 70–99)
HBA1C MFR BLD: 4.8 % (ref ?–5.7)
HDLC SERPL-MCNC: 74 MG/DL (ref 40–59)
LDLC SERPL CALC-MCNC: 112 MG/DL (ref ?–100)
NONHDLC SERPL-MCNC: 121 MG/DL (ref ?–130)
OSMOLALITY SERPL CALC.SUM OF ELEC: 286 MOSM/KG (ref 275–295)
POTASSIUM SERPL-SCNC: 4.2 MMOL/L (ref 3.5–5.1)
PROT SERPL-MCNC: 7.2 G/DL (ref 5.7–8.2)
SODIUM SERPL-SCNC: 139 MMOL/L (ref 136–145)
T3FREE SERPL-MCNC: 3.09 PG/ML (ref 2.4–4.2)
T4 FREE SERPL-MCNC: 1.2 NG/DL (ref 0.8–1.7)
TRIGL SERPL-MCNC: 50 MG/DL (ref 30–149)
TSI SER-ACNC: 0.5 UIU/ML (ref 0.55–4.78)
VLDLC SERPL CALC-MCNC: 9 MG/DL (ref 0–30)

## 2025-05-23 PROCEDURE — 84443 ASSAY THYROID STIM HORMONE: CPT

## 2025-05-23 PROCEDURE — 84481 FREE ASSAY (FT-3): CPT

## 2025-05-23 PROCEDURE — 84439 ASSAY OF FREE THYROXINE: CPT

## 2025-05-23 PROCEDURE — 83036 HEMOGLOBIN GLYCOSYLATED A1C: CPT

## 2025-05-23 PROCEDURE — 80053 COMPREHEN METABOLIC PANEL: CPT

## 2025-05-23 PROCEDURE — 36415 COLL VENOUS BLD VENIPUNCTURE: CPT

## 2025-05-23 PROCEDURE — 80061 LIPID PANEL: CPT

## 2025-05-23 NOTE — TELEPHONE ENCOUNTER
Patient called the office.  She scheduled an appointment with Dr. Dannielle Correa on 12/2. (Endocrinologist).    Ins: [facility] - Plan: [facility]

## 2025-05-27 ENCOUNTER — PATIENT MESSAGE (OUTPATIENT)
Dept: INTERNAL MEDICINE CLINIC | Facility: CLINIC | Age: 35
End: 2025-05-27

## 2025-06-16 ENCOUNTER — OFFICE VISIT (OUTPATIENT)
Dept: INTERNAL MEDICINE CLINIC | Facility: CLINIC | Age: 35
End: 2025-06-16
Payer: COMMERCIAL

## 2025-06-16 VITALS
TEMPERATURE: 98 F | BODY MASS INDEX: 19.46 KG/M2 | HEART RATE: 95 BPM | HEIGHT: 67 IN | OXYGEN SATURATION: 98 % | SYSTOLIC BLOOD PRESSURE: 110 MMHG | DIASTOLIC BLOOD PRESSURE: 60 MMHG | WEIGHT: 124 LBS

## 2025-06-16 DIAGNOSIS — R55 SYNCOPE, UNSPECIFIED SYNCOPE TYPE: Primary | ICD-10-CM

## 2025-06-16 DIAGNOSIS — E05.90 HYPERTHYROIDISM: ICD-10-CM

## 2025-06-16 PROBLEM — R40.20 LOSS OF CONSCIOUSNESS (HCC): Status: ACTIVE | Noted: 2025-06-16

## 2025-06-16 LAB
ATRIAL RATE: 86 BPM
P AXIS: 79 DEGREES
P-R INTERVAL: 134 MS
Q-T INTERVAL: 368 MS
QRS DURATION: 88 MS
QTC CALCULATION (BEZET): 440 MS
R AXIS: 74 DEGREES
T AXIS: 40 DEGREES
VENTRICULAR RATE: 86 BPM

## 2025-06-16 NOTE — ASSESSMENT & PLAN NOTE
Patient with hyperthyroidism.  She was on methimazole which she now has self discontinued.  Advised close follow-up with endocrinology to discuss management options  Check TSH reflex

## 2025-06-16 NOTE — PROGRESS NOTES
FAMILY MEDICINE CLINIC NOTE    HPI  Veronica Rosales is a 34 year old female presenting for     #LOC  -reports recently passed out 4/12/25  -thinks she had a seizure  -was taking a bath, was getting up and blacked out  -when waking up felt like she was shaking  -not sure how long she lost conscious before - also in March  -hit back of the head on faucet - denies significant pain  -left side of the head feels weird   -reports this is not her first time passing out  -denies CP, palpitations   -no HA  -feels like blood pressure dropping    #Hyperthyroidism  #Thyroid nodules  -was previously on methimiazole - reports not taking any longer; off for 3-4 months   -previously seeing Dr Schaffer - does not want to see  -US thyroid 8/2024    ----other     #HPV  -6/2023 normal pap, HPV positive  -colposcopy 7/2023  -7/2024 normal pap, HPV negative  -OBGYN Lucie Reyna  -going to transition back to OBGYN Dr Loza    #HLD  -lifestyle modifications      ROS  GENERAL: No fever/chills, no recent weight loss  HEENT: No visual changes, no changes in hearing, no sore throats, +head pain  NECK: No pain, no swelling  RESP: No cough, no SOB  CV: No chest pain, no palpitations  GI: No abd pain, no N/V/D  MSK: No edema  SKIN: No new rashes  NEURO: No numbness, no tingling, no headaches, +LOC    HEALTH MAINTENANCE  Health Maintenance Topics with due status: Overdue       Topic Date Due    DTaP,Tdap,and Td Vaccines Never done    COVID-19 Vaccine Never done       ALLERGIES  Allergies[1]    MEDICATIONS  Current Medications[2]    ACTIVE PROBLEMS  Problem List[3]    PAST MEDICAL HISTORY  Past Medical History[4]    PAST SOCIAL HISTORY  Social History     Socioeconomic History    Marital status:      Spouse name: Not on file    Number of children: Not on file    Years of education: Not on file    Highest education level: Not on file   Occupational History    Not on file   Tobacco Use    Smoking status: Former     Current packs/day: 0.00      Average packs/day: 0.5 packs/day for 6.0 years (3.0 ttl pk-yrs)     Types: Cigarettes     Start date:      Quit date: 2016     Years since quittin.4    Smokeless tobacco: Never   Vaping Use    Vaping status: Some Days    Substances: Nicotine, Flavoring   Substance and Sexual Activity    Alcohol use: Yes     Comment: Weekends - 1 bottle    Drug use: Yes     Types: Cannabis    Sexual activity: Yes     Partners: Male     Birth control/protection: Mirena   Other Topics Concern    Not on file   Social History Narrative    Relationships:  - Leandro    Children: Darinel (2M)    Pets: 2 Dogs    School: N/A    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      Origin: From [facility] [place] [facility], came to  .     Interests: Enjoys watching TV, drinking wine, cars M6    Spiritual:  Mormonism Mu-ism     Social Drivers of Health     Food Insecurity: Not on file   Transportation Needs: Not on file   Stress: Not on file   Housing Stability: Not on file       PAST SURGICAL HISTORY  Past Surgical History[5]    PAST FAMILY HISTORY  Family History[6]      PHYSICAL EXAM  Vitals:    25 0951   BP: 110/60   Pulse: 95   Temp: 98.4 °F (36.9 °C)   SpO2: 98%   Weight: 124 lb (56.2 kg)   Height: 5' 7\" (1.702 m)      Body mass index is 19.42 kg/m².    GENERAL: NAD  HEENT: Moist mucous membranes, no tonsillar swelling or erythema, PERRLA bilat, TM translucent and non-bulging. Minimal tenderness to palpation of the posterior scalp without any obvious swelling or bruising.   NECK: Supple, non-tender. FROM of the neck.   RESP: Non-labored respirations, CTAB, no wheezing, no rales, no rhonchi  CV: RRR, no murmurs  MSK: No edema  SKIN: Warm and dry, no rashes  NEURO: Answering questions appropriately  CN II: Peripheral vision intact. PERRLA.  CN III, IV, VI: Extraocular movements are intact.  CN V: Facial sensation is intact to light touch bilaterally.   CN VII: Face is symmetric with normal eye closure and smile.  CN VIII:  Hearing is normal to rubbing fingers  CN IX, X: Palate elevates symmetrically. Phonation is normal.  CN XI: Head turning and shoulder shrug are intact  CN XII: Tongue is midline with normal movements and no atrophy.      LABS  Lab Results   Component Value Date    WBC 6.9 09/11/2023    HGB 13.8 09/11/2023    HCT 43.3 09/11/2023    .0 09/11/2023    NEPERCENT 52.2 09/11/2023    LYPERCENT 38.0 09/11/2023    MOPERCENT 8.4 09/11/2023    EOPERCENT 0.9 09/11/2023    BAPERCENT 0.4 09/11/2023    NE 3.62 09/11/2023    LYMABS 2.64 09/11/2023    MOABSO 0.58 09/11/2023    EOABSO 0.06 09/11/2023    BAABSO 0.03 09/11/2023       Lab Results   Component Value Date     05/23/2025    K 4.2 05/23/2025     05/23/2025    CO2 26.0 05/23/2025    ANIONGAP 5 05/23/2025    BUN 11 05/23/2025    CREATSERUM 0.83 05/23/2025    BUNCREA 13.3 05/23/2025    GLU 81 05/23/2025    CA 9.6 05/23/2025    OSMOCALC 286 05/23/2025    ALT <7 (L) 05/23/2025    AST 12 05/23/2025    ALKPHO 40 05/23/2025    BILT 0.9 05/23/2025    TP 7.2 05/23/2025    ALB 4.7 05/23/2025    GLOBULIN 2.5 05/23/2025    ELECTAG 1.9 05/23/2025    FASTING Yes 05/23/2025    FASTING Yes 05/23/2025         Lab Results   Component Value Date    CHOLEST 195 05/23/2025    TRIG 50 05/23/2025    HDL 74 (H) 05/23/2025     (H) 05/23/2025    VLDL 9 05/23/2025    NONHDLC 121 05/23/2025        DIAGNOSTICS      ASSESSMENT/PLAN  Problem List Items Addressed This Visit          Endocrine and Metabolic    Hyperthyroidism    Patient with hyperthyroidism.  She was on methimazole which she now has self discontinued.  Advised close follow-up with endocrinology to discuss management options  Check TSH reflex            Symptoms and Signs    Syncope - Primary    Patient with a recent episode of syncope.  She reports she has had prior episodes of syncope in the past.   Consider vasovagal syncope.  EKG in office is normal  Will check labs including CBC, CMP, TSH.    Advise close  follow-up with endocrinology if possible.  She notes some head shaking when waking up, however apart from this no other seizure-like activity.  Offered CT head - she declines this, she states she feels well at this time.   Offered neurology evaluation - declines as well.  Blood pressures are slightly on the lower side. She reports her blood pressures have always been somewhat low.  Will refer to cardiology to further assess and monitor.   Advise staying well-hydrated.  Advised evaluation with neurology.         Relevant Orders    CBC With Differential With Platelet    Comp Metabolic Panel (14)    TSH W Reflex To Free T4    EKG In-Office [[id number]] (Completed)    CARDIO - INTERNAL       Return in about 1 year (around 2026) for physical.    This is a Header Test   Topic Date Due    Annual Physical  2026        Davon Aviles MD  Family Medicine           Pre-chartin minutes  Reviewing/obtaining: 10 minutes  Medical Exam:1 minutes  Counseling/education: 5 minutes  Notes: 5 minutes  Referring/communicatin minutes  Care coordination: 0 minutes    My total time spent caring for the patient on the day of the encounter: 23 minutes         [1] No Known Allergies  [2]   Current Outpatient Medications   Medication Sig Dispense Refill    Levonorgestrel (MIRENA, 52 MG,) 20 MCG/DAY Intrauterine IUD 20 mcg (1 each total) by Intrauterine route one time.      methIMAzole 10 MG Oral Tab Take 1 tablet (10 mg total) by mouth daily. (Patient not taking: Reported on 2025) 90 tablet 3   [3]   Patient Active Problem List  Diagnosis    Hyperthyroidism    Multinodular goiter    IUD (intrauterine device) in place    History of HPV infection    Hyperlipidemia    Health maintenance examination    Syncope   [4]   Past Medical History:   Hyperthyroidism   [5]   Past Surgical History:  Procedure Laterality Date    Cosmetic surgery Bilateral     Ear   [6]   Family History  Problem Relation Age of Onset    Cancer Mother 50         sarcoma of ovaries    Hypertension Mother     Hypertension Father     DVT/VTE Maternal Grandmother     Other (Anorexia) Maternal Grandfather     No Known Problems Paternal Grandmother     Heart Disease Paternal Grandfather     No Known Problems Half-Sister     No Known Problems Half-Sister     Colon Cancer Neg     Breast Cancer Neg

## 2025-06-16 NOTE — ASSESSMENT & PLAN NOTE
Patient with a recent episode of syncope.  She reports she has had prior episodes of syncope in the past.   Consider vasovagal syncope.  EKG in office is normal  Will check labs including CBC, CMP, TSH.    Advise close follow-up with endocrinology if possible.  She notes some head shaking when waking up, however apart from this no other seizure-like activity.  Offered CT head - she declines this, she states she feels well at this time.   Offered neurology evaluation - declines as well.  Blood pressures are slightly on the lower side. She reports her blood pressures have always been somewhat low.  Will refer to cardiology to further assess and monitor.   Advise staying well-hydrated.  Advised evaluation with neurology.

## 2025-06-16 NOTE — PATIENT INSTRUCTIONS
PATIENT INSTRUCTIONS    Thank you for seeing me today, it was a pleasure taking care of you.  Please check out at the  and schedule a follow up appointment.  Return in about 1 year (around 6/16/2026) for physical.  Please remember that the preferred arti period for appointments is 5 minutes. This is to help maximize the amount of time that we can spend together at our visits.    Cardiology   Blood work  Stay hydrated  Close follow up with endocrinology      Dr. Stefan Duran

## 2025-06-27 ENCOUNTER — LAB ENCOUNTER (OUTPATIENT)
Dept: LAB | Age: 35
End: 2025-06-27
Attending: FAMILY MEDICINE
Payer: COMMERCIAL

## 2025-06-27 LAB
ALBUMIN SERPL-MCNC: 4.6 G/DL (ref 3.2–4.8)
ALBUMIN/GLOB SERPL: 1.7 {RATIO} (ref 1–2)
ALP LIVER SERPL-CCNC: 42 U/L (ref 37–98)
ALT SERPL-CCNC: <7 U/L (ref 10–49)
ANION GAP SERPL CALC-SCNC: 6 MMOL/L (ref 0–18)
AST SERPL-CCNC: 10 U/L (ref ?–34)
BASOPHILS # BLD AUTO: 0.04 X10(3) UL (ref 0–0.2)
BASOPHILS NFR BLD AUTO: 0.6 %
BILIRUB SERPL-MCNC: 0.8 MG/DL (ref 0.3–1.2)
BUN BLD-MCNC: 11 MG/DL (ref 9–23)
BUN/CREAT SERPL: 13.8 (ref 10–20)
CALCIUM BLD-MCNC: 9.5 MG/DL (ref 8.7–10.4)
CHLORIDE SERPL-SCNC: 104 MMOL/L (ref 98–112)
CO2 SERPL-SCNC: 27 MMOL/L (ref 21–32)
CREAT BLD-MCNC: 0.8 MG/DL (ref 0.55–1.02)
DEPRECATED RDW RBC AUTO: 37.8 FL (ref 35.1–46.3)
EGFRCR SERPLBLD CKD-EPI 2021: 100 ML/MIN/1.73M2 (ref 60–?)
EOSINOPHIL # BLD AUTO: 0.05 X10(3) UL (ref 0–0.7)
EOSINOPHIL NFR BLD AUTO: 0.7 %
ERYTHROCYTE [DISTWIDTH] IN BLOOD BY AUTOMATED COUNT: 11.7 % (ref 11–15)
FASTING STATUS PATIENT QL REPORTED: YES
GLOBULIN PLAS-MCNC: 2.7 G/DL (ref 2–3.5)
GLUCOSE BLD-MCNC: 86 MG/DL (ref 70–99)
HCT VFR BLD AUTO: 38.2 % (ref 35–48)
HGB BLD-MCNC: 12.5 G/DL (ref 12–16)
IMM GRANULOCYTES # BLD AUTO: 0.01 X10(3) UL (ref 0–1)
IMM GRANULOCYTES NFR BLD: 0.1 %
LYMPHOCYTES # BLD AUTO: 2.51 X10(3) UL (ref 1–4)
LYMPHOCYTES NFR BLD AUTO: 35.9 %
MCH RBC QN AUTO: 29 PG (ref 26–34)
MCHC RBC AUTO-ENTMCNC: 32.7 G/DL (ref 31–37)
MCV RBC AUTO: 88.6 FL (ref 80–100)
MONOCYTES # BLD AUTO: 0.52 X10(3) UL (ref 0.1–1)
MONOCYTES NFR BLD AUTO: 7.4 %
NEUTROPHILS # BLD AUTO: 3.87 X10 (3) UL (ref 1.5–7.7)
NEUTROPHILS # BLD AUTO: 3.87 X10(3) UL (ref 1.5–7.7)
NEUTROPHILS NFR BLD AUTO: 55.3 %
OSMOLALITY SERPL CALC.SUM OF ELEC: 283 MOSM/KG (ref 275–295)
PLATELET # BLD AUTO: 258 10(3)UL (ref 150–450)
POTASSIUM SERPL-SCNC: 4.1 MMOL/L (ref 3.5–5.1)
PROT SERPL-MCNC: 7.3 G/DL (ref 5.7–8.2)
RBC # BLD AUTO: 4.31 X10(6)UL (ref 3.8–5.3)
SODIUM SERPL-SCNC: 137 MMOL/L (ref 136–145)
T3FREE SERPL-MCNC: 3.11 PG/ML (ref 2.4–4.2)
T4 FREE SERPL-MCNC: 1.1 NG/DL (ref 0.8–1.7)
TSI SER-ACNC: 0.55 UIU/ML (ref 0.55–4.78)
WBC # BLD AUTO: 7 X10(3) UL (ref 4–11)

## 2025-06-27 PROCEDURE — 84443 ASSAY THYROID STIM HORMONE: CPT | Performed by: FAMILY MEDICINE

## 2025-06-27 PROCEDURE — 84481 FREE ASSAY (FT-3): CPT | Performed by: FAMILY MEDICINE

## 2025-06-27 PROCEDURE — 84439 ASSAY OF FREE THYROXINE: CPT | Performed by: FAMILY MEDICINE

## 2025-06-27 PROCEDURE — 80053 COMPREHEN METABOLIC PANEL: CPT | Performed by: FAMILY MEDICINE

## 2025-06-27 PROCEDURE — 85025 COMPLETE CBC W/AUTO DIFF WBC: CPT | Performed by: FAMILY MEDICINE

## 2025-06-27 PROCEDURE — 36415 COLL VENOUS BLD VENIPUNCTURE: CPT | Performed by: FAMILY MEDICINE

## (undated) NOTE — LETTER
AUTHORIZATION FOR SURGICAL OPERATION OR OTHER PROCEDURE    1. I hereby authorize AMA DARDEN, and [facility] staff assigned to my case to perform the following operation and/or procedure at the [facility] site:    _______________________________________________________________________________________________  IUD EXCHANGE    _______________________________________________________________________________________________    2.  My physician has explained the nature and purpose of the operation or other procedure, possible alternative methods of treatment, the risks involved, and the possibility of complication to me.  I acknowledge that no guarantee has been made as to the result that may be obtained.  3.  I recognize that, during the course of this operation, or other procedure, unforseen conditions may necessitate additional or different procedure than those listed above.  I, therefore, further authorize and request that the above named physician, his/her physician assistants or designees perform such procedures as are, in his/her professional opinion, necessary and desirable.  4.  Any tissue or organs removed in the operation or other procedure may be disposed of by and at the discretion of the [facility] and [facility].  5.  I understand that in the event of a medical emergency, I will be transported by local paramedics to [facility] or other hospital emergency department.  6.  I certify that I have read and fully understand the above consent to operation and/or other procedure.    7.  I acknowledge that my physician has explained sedation/analgesia administration to me including the risks and benefits.  I consent to the administration of sedation/analgesia as may be necessary or desirable in the judgement of my physician.    Witness signature: ___________________________________________________ Date:  ______/______/_____                     Time:  ________ A.M.  P.M.       Patient Name:  ______________________________________________________  (please print)      Patient signature:  ___________________________________________________             Relationship to Patient:           []  Parent    Responsible person                          []  Spouse  In case of minor or                    [] Other  _____________   Incompetent name:  __________________________________________________                               (please print)      _____________      Responsible person  In case of minor or  Incompetent signature:  _______________________________________________    Statement of Physician  My signature below affirms that prior to the time of the procedure, I have explained to the patient and/or his/her guardian, the risks and benefits involved in the proposed treatment and any reasonable alternative to the proposed treatment.  I have also explained the risks and benefits involved in the refusal of the proposed treatment and have answered the patient's questions.                        Date:  ______/______/_______  Provider                      Signature:  __________________________________________________________       Time:  ___________ A.M    P.M.